# Patient Record
Sex: FEMALE | Race: WHITE | NOT HISPANIC OR LATINO | Employment: FULL TIME | URBAN - METROPOLITAN AREA
[De-identification: names, ages, dates, MRNs, and addresses within clinical notes are randomized per-mention and may not be internally consistent; named-entity substitution may affect disease eponyms.]

---

## 2017-02-21 ENCOUNTER — GENERIC CONVERSION - ENCOUNTER (OUTPATIENT)
Dept: OTHER | Facility: OTHER | Age: 22
End: 2017-02-21

## 2017-04-18 ENCOUNTER — ALLSCRIPTS OFFICE VISIT (OUTPATIENT)
Dept: OTHER | Facility: OTHER | Age: 22
End: 2017-04-18

## 2018-01-11 ENCOUNTER — ALLSCRIPTS OFFICE VISIT (OUTPATIENT)
Dept: OTHER | Facility: OTHER | Age: 23
End: 2018-01-11

## 2018-01-11 ENCOUNTER — GENERIC CONVERSION - ENCOUNTER (OUTPATIENT)
Dept: OTHER | Facility: OTHER | Age: 23
End: 2018-01-11

## 2018-01-11 DIAGNOSIS — G44.209 TENSION-TYPE HEADACHE, NOT INTRACTABLE: ICD-10-CM

## 2018-01-11 DIAGNOSIS — J30.2 OTHER SEASONAL ALLERGIC RHINITIS: ICD-10-CM

## 2018-01-11 DIAGNOSIS — R42 DIZZINESS AND GIDDINESS: ICD-10-CM

## 2018-01-11 DIAGNOSIS — R09.82 POSTNASAL DRIP: ICD-10-CM

## 2018-01-11 DIAGNOSIS — R53.83 OTHER FATIGUE: ICD-10-CM

## 2018-01-13 VITALS
DIASTOLIC BLOOD PRESSURE: 82 MMHG | BODY MASS INDEX: 21.22 KG/M2 | SYSTOLIC BLOOD PRESSURE: 128 MMHG | WEIGHT: 127.38 LBS | TEMPERATURE: 98 F | OXYGEN SATURATION: 99 % | HEART RATE: 78 BPM | HEIGHT: 65 IN

## 2018-01-13 NOTE — PROGRESS NOTES
Assessment   1  Dizziness (780 4) (R42)   2  Headache, tension-type (339 10) (G44 209)   3  Post-nasal drip (784 91) (R09 82)   4  Fatigue (780 79) (R53 83)   5  Sleep disturbances (780 50) (G47 9)    Plan   Dizziness, Headache, tension-type, Post-nasal drip, Seasonal allergies    · (1) COMPREHENSIVE METABOLIC PANEL; Status:Active; Requested [id number]; Dizziness, Post-nasal drip, Seasonal allergies    · (1) CBC/PLT/DIFF; Status:Active; Requested [id number]; Fatigue    · (1) TSH WITH FT4 REFLEX; Status:Active; Requested [id number]; Post-nasal drip    · CT SINUS WO CONTRAST; Status:Need Information - Financial Authorization; Requested [id number];   Seasonal allergies    · (1) ALLERGY, NORTHEAST PANEL ADULT; Status:Active; Requested [id number]; Discussion/Summary      Symptoms of dizziness and headache- check CT of sinuses and allergy panel  Check labs  We will call with these results  Ibuprofen 600 mg or Excedrin Migraine for headache pain  disturbance- take melatonin 5- 10 mg one hour before bed  The patient was counseled regarding  Possible side effects of new medications were reviewed with the patient/guardian today  The treatment plan was reviewed with the patient/guardian  The patient/guardian understands and agrees with the treatment plan       Self Referrals: No      Chief Complaint   Patient seen in office today for a c/o not feeling right - stated that she feels like she is in a fog, almost going to pass out and headaches as well - symptoms started about 3 weeks ago and the throbbing started 3 days ago  History of Present Illness   Pt has been feeling off for the past three weeks  She has a feeling of foggy and not alert  She feels dizziness and at times feels like she is going to pass out  No blackouts  For the past few days, she has felt throbbing in the right frontal area of her head  She has a weird tingling sensation at the base of her neck which radiates forward  She has been able to exercise daily  She has had no change in her diet  No nausea  Denies double vision or blurred vision  Two weeks ago, had URI symptoms which have resolved  Symptoms are intermittent  She is able to function at work without incident  has anxiety which she states prevents her from sleeping well  She has trouble calming her brain down to sleep  the past she did try Melatonin but a friend told her that it will make me hypothermic in my sleep and so she stopped taking it  Review of Systems        Constitutional: feeling poorly, but-- as noted in HPI  Eyes: No complaints of eye pain, no red eyes, no eyesight problems, no discharge, no dry eyes, no itching of eyes  ENT: ear fullness, but-- as noted in HPI  Cardiovascular: No complaints of slow heart rate, no fast heart rate, no chest pain, no palpitations, no leg claudication, no lower extremity edema  Neurological: headache, but-- as noted in HPI  Psychiatric: anxiety-- and-- sleep disturbances, but-- as noted in HPI  Active Problems   1  Seasonal allergies (477 9) (J30 2)   2  Sebaceous cyst (706 2) (L72 3)    Past Medical History   1  History of Allergic rhinitis (477 9) (J30 9)   2  History of Cough (786 2) (R05)   3  History of Dysuria (788 1) (R30 0)   4  History of Sorethroat (462) (J02 9)   5  History of Urinary tract infection (599 0) (N39 0)     The active problems and past medical history were reviewed and updated today  Surgical History      The surgical history was reviewed and updated today  Family History   Mother    1  No pertinent family history  Father    2  No pertinent family history     The family history was reviewed and updated today  Social History    · Caffeine Use   · Never a smoker   · Never Drank Alcohol  The social history was reviewed and updated today  The social history was reviewed and is unchanged  Current Meds    1   Tri-Sprintec 0 18/0 215/0 25 MG-35 MCG Oral Tablet; TAKE AS DIRECTED; Therapy: (Recorded:18Apr2017) to Recorded     The medication list was reviewed and updated today  Allergies   1  Augmentin TABS    Physical Exam        Constitutional      General appearance: No acute distress, well appearing and well nourished  Eyes      Conjunctiva and lids: No swelling, erythema or discharge  Pupils and irises: Equal, round and reactive to light  Ears, Nose, Mouth, and Throat      External inspection of ears and nose: Normal        Otoscopic examination: Tympanic membranes translucent with normal light reflex  Canals patent without erythema  Nasal mucosa, septum, and turbinates: Normal without edema or erythema  Oropharynx: Normal with no erythema, edema, exudate or lesions  Pulmonary      Respiratory effort: No increased work of breathing or signs of respiratory distress  Auscultation of lungs: Clear to auscultation  Cardiovascular      Palpation of heart: Normal PMI, no thrills  Auscultation of heart: Normal rate and rhythm, normal S1 and S2, without murmurs  Carotid pulses: Normal        Abdomen      Abdomen: Non-tender, no masses  Lymphatic      Palpation of lymph nodes in neck: No lymphadenopathy  Musculoskeletal      Gait and station: Normal        Skin      Skin and subcutaneous tissue: Normal without rashes or lesions  Neurologic      Cranial nerves: Cranial nerves 2-12 intact  Sensation: No sensory loss         Psychiatric      Orientation to person, place, and time: Normal        Mood and affect: Normal           Signatures    Electronically signed by : Naty Hoyt NP; Jan 11 2018  4:28PM EST                       (Author)     Electronically signed by : Molly Banda MD; Jan 12 2018  8:16AM EST                       (Co-author)

## 2018-01-17 ENCOUNTER — GENERIC CONVERSION - ENCOUNTER (OUTPATIENT)
Dept: OTHER | Facility: OTHER | Age: 23
End: 2018-01-17

## 2018-01-18 LAB
A/G RATIO (HISTORICAL): 2.3 (ref 1.2–2.2)
ALBUMIN SERPL BCP-MCNC: 4.6 G/DL (ref 3.5–5.5)
ALP SERPL-CCNC: 51 IU/L (ref 39–117)
ALT SERPL W P-5'-P-CCNC: 12 IU/L (ref 0–32)
AST SERPL W P-5'-P-CCNC: 14 IU/L (ref 0–40)
BASOPHILS # BLD AUTO: 0.1 X10E3/UL (ref 0–0.2)
BASOPHILS # BLD AUTO: 1 %
BILIRUB SERPL-MCNC: 0.2 MG/DL (ref 0–1.2)
BUN SERPL-MCNC: 14 MG/DL (ref 6–20)
BUN/CREA RATIO (HISTORICAL): 16 (ref 9–23)
CALCIUM SERPL-MCNC: 9.4 MG/DL (ref 8.7–10.2)
CHLORIDE SERPL-SCNC: 103 MMOL/L (ref 96–106)
CO2 SERPL-SCNC: 24 MMOL/L (ref 18–29)
CREAT SERPL-MCNC: 0.89 MG/DL (ref 0.57–1)
DEPRECATED RDW RBC AUTO: 13.3 % (ref 12.3–15.4)
EGFR AFRICAN AMERICAN (HISTORICAL): 105 ML/MIN/1.73
EGFR-AMERICAN CALC (HISTORICAL): 91 ML/MIN/1.73
EOSINOPHIL # BLD AUTO: 0.2 X10E3/UL (ref 0–0.4)
EOSINOPHIL # BLD AUTO: 2 %
GLUCOSE SERPL-MCNC: 82 MG/DL (ref 65–99)
HCT VFR BLD AUTO: 42.1 % (ref 34–46.6)
HGB BLD-MCNC: 13.8 G/DL (ref 11.1–15.9)
IMM.GRANULOCYTES (CD4/8) (HISTORICAL): 0 %
IMM.GRANULOCYTES (CD4/8) (HISTORICAL): 0 X10E3/UL (ref 0–0.1)
LYMPHOCYTES # BLD AUTO: 2.4 X10E3/UL (ref 0.7–3.1)
LYMPHOCYTES # BLD AUTO: 26 %
MCH RBC QN AUTO: 30 PG (ref 26.6–33)
MCHC RBC AUTO-ENTMCNC: 32.8 G/DL (ref 31.5–35.7)
MCV RBC AUTO: 92 FL (ref 79–97)
MONOCYTES # BLD AUTO: 0.5 X10E3/UL (ref 0.1–0.9)
MONOCYTES (HISTORICAL): 5 %
NEUTROPHILS # BLD AUTO: 6.1 X10E3/UL (ref 1.4–7)
NEUTROPHILS # BLD AUTO: 66 %
PLATELET # BLD AUTO: 285 X10E3/UL (ref 150–379)
POTASSIUM SERPL-SCNC: 4.5 MMOL/L (ref 3.5–5.2)
RBC (HISTORICAL): 4.6 X10E6/UL (ref 3.77–5.28)
SODIUM SERPL-SCNC: 142 MMOL/L (ref 134–144)
TOT. GLOBULIN, SERUM (HISTORICAL): 2 G/DL (ref 1.5–4.5)
TOTAL PROTEIN (HISTORICAL): 6.6 G/DL (ref 6–8.5)
TSH SERPL DL<=0.05 MIU/L-ACNC: 1.87 UIU/ML (ref 0.45–4.5)
WBC # BLD AUTO: 9.2 X10E3/UL (ref 3.4–10.8)

## 2018-01-23 LAB
ALLERGEN CAT EPITHELIUM-DANDER (HISTORICAL): 0.9 KU/L
ALLERGEN ELM (HISTORICAL): <0.1 KU/L
ALLERGEN MAPLE/BOX ELDER (HISTORICAL): <0.1 KU/L
ALLERGEN OAK, WHITE (HISTORICAL): 0.13 KU/L
ALLERGEN ROUGH PIGWEED (W14) IGE (HISTORICAL): <0.1 KU/L
ALLERGEN SHEEP SORREL (W18) IGE (HISTORICAL): <0.1 KU/L
ALLERGEN, COMMON SILVER BIRCH (HISTORICAL): 0.17 KU/L
ALTERNARIA ALTERNATA (HISTORICAL): <0.1 KU/L
ASPERGILLUS FUMIGATUS (HISTORICAL): <0.1 KU/L
BAHIA GRASS (HISTORICAL): 0.65 KU/L
BERMUDA GRASS (HISTORICAL): <0.1 KU/L
CLADOSPORIUM HERBARUM (HISTORICAL): <0.1 KU/L
COCKROACH (HISTORICAL): <0.1 KU/L
COMMON RAGWEED (HISTORICAL): <0.1 KU/L
D. FARINAE (HISTORICAL): 3.38 KU/L
D. PTERONYSSINUS (HISTORICAL): 6.58 KU/L
DOG DANDER (HISTORICAL): 1.4 KU/L
IMMUNOGLOBULIN E, TOTAL (HISTORICAL): 45 IU/ML (ref 0–100)
Lab: <0.1 KU/L
MOUNTAIN CEDAR TREE (HISTORICAL): <0.1 KU/L
MOUSE URINE (HISTORICAL): <0.1 KU/L
NETTLE (HISTORICAL): <0.1 KU/L
PENICILLIUM CHRYSOGENUM (HISTORICAL): <0.1 KU/L
TIMOTHY GRASS (HISTORICAL): 1.92 KU/L

## 2018-01-24 VITALS
TEMPERATURE: 98.6 F | HEIGHT: 65 IN | DIASTOLIC BLOOD PRESSURE: 80 MMHG | OXYGEN SATURATION: 99 % | WEIGHT: 127 LBS | HEART RATE: 86 BPM | BODY MASS INDEX: 21.16 KG/M2 | SYSTOLIC BLOOD PRESSURE: 128 MMHG

## 2018-06-20 ENCOUNTER — APPOINTMENT (OUTPATIENT)
Dept: LAB | Facility: HOSPITAL | Age: 23
End: 2018-06-20
Payer: COMMERCIAL

## 2018-06-20 ENCOUNTER — OFFICE VISIT (OUTPATIENT)
Dept: FAMILY MEDICINE CLINIC | Facility: CLINIC | Age: 23
End: 2018-06-20
Payer: COMMERCIAL

## 2018-06-20 VITALS
WEIGHT: 126.8 LBS | TEMPERATURE: 97.5 F | HEART RATE: 75 BPM | HEIGHT: 65 IN | OXYGEN SATURATION: 99 % | SYSTOLIC BLOOD PRESSURE: 110 MMHG | DIASTOLIC BLOOD PRESSURE: 78 MMHG | BODY MASS INDEX: 21.13 KG/M2

## 2018-06-20 DIAGNOSIS — J32.9 CHRONIC SINUSITIS, UNSPECIFIED LOCATION: Primary | ICD-10-CM

## 2018-06-20 DIAGNOSIS — L01.00 IMPETIGO: ICD-10-CM

## 2018-06-20 DIAGNOSIS — R42 DIZZINESS: ICD-10-CM

## 2018-06-20 DIAGNOSIS — Z91.09 ENVIRONMENTAL ALLERGIES: ICD-10-CM

## 2018-06-20 PROCEDURE — 87070 CULTURE OTHR SPECIMN AEROBIC: CPT

## 2018-06-20 PROCEDURE — 87186 SC STD MICRODIL/AGAR DIL: CPT

## 2018-06-20 PROCEDURE — 99214 OFFICE O/P EST MOD 30 MIN: CPT | Performed by: FAMILY MEDICINE

## 2018-06-20 PROCEDURE — 87147 CULTURE TYPE IMMUNOLOGIC: CPT

## 2018-06-20 PROCEDURE — 1036F TOBACCO NON-USER: CPT | Performed by: FAMILY MEDICINE

## 2018-06-20 PROCEDURE — 3008F BODY MASS INDEX DOCD: CPT | Performed by: FAMILY MEDICINE

## 2018-06-20 PROCEDURE — 87205 SMEAR GRAM STAIN: CPT

## 2018-06-20 RX ORDER — NORGESTIMATE AND ETHINYL ESTRADIOL 7DAYSX3 28
KIT ORAL
COMMUNITY
Start: 2018-05-30 | End: 2020-10-15 | Stop reason: ALTCHOICE

## 2018-06-20 RX ORDER — DOXYCYCLINE HYCLATE 100 MG/1
100 CAPSULE ORAL EVERY 12 HOURS SCHEDULED
Qty: 14 CAPSULE | Refills: 0 | Status: SHIPPED | OUTPATIENT
Start: 2018-06-20 | End: 2018-06-27

## 2018-06-20 NOTE — PROGRESS NOTES
Assessment/Plan:    No problem-specific Assessment & Plan notes found for this encounter  Diagnoses and all orders for this visit:    Chronic sinusitis, unspecified location  after discussing risks and benefits with patient CT scan and justyn RUSSO ordered  -     CT sinus wo contrast; Future  -     loratadine-pseudoephedrine (CLARITIN-D 12-HOUR) 5-120 mg per tablet; Take 1 tablet by mouth 2 (two) times a day for 5 days  -     Ambulatory referral to Allergy; Future    Environmental allergies  -     Ambulatory referral to Allergy; Future    Impetigo  after discussing risk and benefits of medication along with side effects with patient will start:  -     mupirocin (BACTROBAN) 2 % nasal ointment; into each nostril 2 (two) times a day  -     doxycycline hyclate (VIBRAMYCIN) 100 mg capsule; Take 1 capsule (100 mg total) by mouth every 12 (twelve) hours for 7 days    Dizziness  Labyrinthitis? Claritin D should help  Other orders  -     Peters Camera 0 18/0 215/0 25 MG-35 MCG per tablet; Follow up in 1 month or as needed    Subjective:      Patient ID: Caroline Mathews is a 23 y o  female  Patient is here to follow up for several issues she has been feeling dizzy for the past several months which has been on and off for her  She denies any chest pain or shortness of breath related to this  She had blood work done, cbc, TSH and glucose in January 2018 all normal   She also has been having sinus pressure associated with drainage as well  She was supposed to go for a CT scan of the sinuses but never went  Also has a spot on her nose that comes and goes and is crusted  The following portions of the patient's history were reviewed and updated as appropriate:   She  has no past medical history on file    She   Patient Active Problem List    Diagnosis Date Noted    Chronic sinusitis 06/20/2018    Environmental allergies 06/20/2018    Impetigo 06/20/2018    Dizziness 06/20/2018     She  has no past surgical history on file  Her family history is not on file  She  reports that she has never smoked  She has never used smokeless tobacco  Her alcohol and drug histories are not on file  Current Outpatient Prescriptions   Medication Sig Dispense Refill    TRI FEMYNOR 0 18/0 215/0 25 MG-35 MCG per tablet       doxycycline hyclate (VIBRAMYCIN) 100 mg capsule Take 1 capsule (100 mg total) by mouth every 12 (twelve) hours for 7 days 14 capsule 0    loratadine-pseudoephedrine (CLARITIN-D 12-HOUR) 5-120 mg per tablet Take 1 tablet by mouth 2 (two) times a day for 5 days 10 tablet 0    mupirocin (BACTROBAN) 2 % nasal ointment into each nostril 2 (two) times a day 10 g 0     No current facility-administered medications for this visit  No current outpatient prescriptions on file prior to visit  No current facility-administered medications on file prior to visit  She is allergic to amoxicillin-pot clavulanate       Review of Systems   Constitutional: Negative for activity change, appetite change, fatigue and fever  HENT: Positive for postnasal drip and sinus pain  Negative for congestion and ear discharge  Respiratory: Negative for cough and shortness of breath  Cardiovascular: Negative for chest pain and palpitations  Gastrointestinal: Negative for diarrhea and nausea  Musculoskeletal: Negative for arthralgias and back pain  Skin: Positive for color change and rash  Neurological: Positive for dizziness  Negative for headaches  Psychiatric/Behavioral: Negative for agitation and behavioral problems  Objective:      /78   Pulse 75   Temp 97 5 °F (36 4 °C)   Ht 5' 5" (1 651 m)   Wt 57 5 kg (126 lb 12 8 oz)   LMP 06/03/2018   SpO2 99%   BMI 21 10 kg/m²          Physical Exam   Constitutional: She is oriented to person, place, and time  She appears well-developed and well-nourished  No distress  HENT:   Head: Normocephalic and atraumatic     Nose: Nose normal    Mouth/Throat: Oropharynx is clear and moist    Bilateral TM effusion no erythema noted however  Honey crusted lesion noted around her noted  Eyes: Conjunctivae are normal  Pupils are equal, round, and reactive to light  Cardiovascular: Normal rate, regular rhythm and normal heart sounds  No murmur heard  Pulmonary/Chest: Effort normal and breath sounds normal  No respiratory distress  She has no wheezes  Abdominal: Soft  Bowel sounds are normal  She exhibits no distension  There is no tenderness  Neurological: She is alert and oriented to person, place, and time  Skin: Skin is warm and dry  No rash noted  She is not diaphoretic  No erythema  Psychiatric: She has a normal mood and affect

## 2018-06-21 ENCOUNTER — TELEPHONE (OUTPATIENT)
Dept: FAMILY MEDICINE CLINIC | Facility: CLINIC | Age: 23
End: 2018-06-21

## 2018-06-21 LAB
EXTERNAL HIV SCREEN: NORMAL
HCV AB SER-ACNC: NEGATIVE

## 2018-06-24 LAB
BACTERIA WND AEROBE CULT: ABNORMAL
BACTERIA WND AEROBE CULT: ABNORMAL
GRAM STN SPEC: ABNORMAL

## 2018-12-19 ENCOUNTER — OFFICE VISIT (OUTPATIENT)
Dept: FAMILY MEDICINE CLINIC | Facility: CLINIC | Age: 23
End: 2018-12-19
Payer: COMMERCIAL

## 2018-12-19 VITALS
BODY MASS INDEX: 20.99 KG/M2 | RESPIRATION RATE: 18 BRPM | WEIGHT: 126 LBS | TEMPERATURE: 98.4 F | HEIGHT: 65 IN | HEART RATE: 71 BPM | DIASTOLIC BLOOD PRESSURE: 64 MMHG | OXYGEN SATURATION: 99 % | SYSTOLIC BLOOD PRESSURE: 112 MMHG

## 2018-12-19 DIAGNOSIS — B34.9 VIRAL ILLNESS: Primary | ICD-10-CM

## 2018-12-19 PROCEDURE — 99213 OFFICE O/P EST LOW 20 MIN: CPT | Performed by: NURSE PRACTITIONER

## 2018-12-19 PROCEDURE — 3008F BODY MASS INDEX DOCD: CPT | Performed by: NURSE PRACTITIONER

## 2018-12-19 PROCEDURE — 1036F TOBACCO NON-USER: CPT | Performed by: NURSE PRACTITIONER

## 2018-12-19 RX ORDER — BROMPHENIRAMINE MALEATE, PSEUDOEPHEDRINE HYDROCHLORIDE, AND DEXTROMETHORPHAN HYDROBROMIDE 2; 30; 10 MG/5ML; MG/5ML; MG/5ML
5 SYRUP ORAL 4 TIMES DAILY PRN
Qty: 120 ML | Refills: 0 | Status: SHIPPED | OUTPATIENT
Start: 2018-12-19 | End: 2018-12-26

## 2018-12-19 RX ORDER — BROMPHENIRAMINE MALEATE, PSEUDOEPHEDRINE HYDROCHLORIDE, AND DEXTROMETHORPHAN HYDROBROMIDE 2; 30; 10 MG/5ML; MG/5ML; MG/5ML
5 SYRUP ORAL 4 TIMES DAILY PRN
Qty: 120 ML | Refills: 0 | Status: SHIPPED | OUTPATIENT
Start: 2018-12-19 | End: 2018-12-19 | Stop reason: SDUPTHER

## 2018-12-19 NOTE — LETTER
December 19, 2018     Patient: Naomi Tineo   YOB: 1995   Date of Visit: 12/19/2018       To Whom it May Concern:    Naomi Tineo is under my professional care  She was seen in my office on 12/19/2018  She may return to work on 12-20-18  If you have any questions or concerns, please don't hesitate to call           Sincerely,          EDUARDA Beck        CC: No Recipients

## 2018-12-19 NOTE — PATIENT INSTRUCTIONS
URI with cough and congestion- treat symptoms   Take prescription decongestant as ordered  May work from home until she feels better  She may need work note extended  Motrin or tylenol for achiness and fever

## 2018-12-19 NOTE — PROGRESS NOTES
Assessment/Plan:    No problem-specific Assessment & Plan notes found for this encounter  Diagnoses and all orders for this visit:    Viral illness  -     Discontinue: brompheniramine-pseudoephedrine-DM 30-2-10 MG/5ML syrup; Take 5 mL by mouth 4 (four) times a day as needed for congestion or cough for up to 7 days  -     brompheniramine-pseudoephedrine-DM 30-2-10 MG/5ML syrup; Take 5 mL by mouth 4 (four) times a day as needed for congestion or cough for up to 7 days          Subjective:      Patient ID: Chris Jackson is a 20 y o  female  Patient is here with two days of cough and cold symptoms  She has sore throat, ear pain and nasal congestion  She took Nyquil last night   No fever  The following portions of the patient's history were reviewed and updated as appropriate:   She  has no past medical history on file  She   Patient Active Problem List    Diagnosis Date Noted    Viral illness 12/19/2018    Chronic sinusitis 06/20/2018    Environmental allergies 06/20/2018    Impetigo 06/20/2018    Dizziness 06/20/2018     She  has no past surgical history on file  Her family history is not on file  She  reports that she has never smoked  She has never used smokeless tobacco  Her alcohol and drug histories are not on file  Current Outpatient Prescriptions   Medication Sig Dispense Refill    brompheniramine-pseudoephedrine-DM 30-2-10 MG/5ML syrup Take 5 mL by mouth 4 (four) times a day as needed for congestion or cough for up to 7 days 120 mL 0    mupirocin (BACTROBAN) 2 % nasal ointment into each nostril 2 (two) times a day (Patient not taking: Reported on 12/19/2018 ) 10 g 0    TRI FEMYNOR 0 18/0 215/0 25 MG-35 MCG per tablet        No current facility-administered medications for this visit        Current Outpatient Prescriptions on File Prior to Visit   Medication Sig    mupirocin (BACTROBAN) 2 % nasal ointment into each nostril 2 (two) times a day (Patient not taking: Reported on 12/19/2018 )    TRI FEMYNOR 0 18/0 215/0 25 MG-35 MCG per tablet      No current facility-administered medications on file prior to visit  She is allergic to amoxicillin-pot clavulanate       Review of Systems   Constitutional: Negative for fatigue and fever  HENT: Positive for congestion, ear pain and sore throat  Negative for postnasal drip, sinus pain and sinus pressure  Respiratory: Positive for cough  Negative for chest tightness and shortness of breath  Cardiovascular: Negative for chest pain and palpitations  Gastrointestinal: Negative for abdominal pain  Skin: Negative for color change, pallor and rash  Allergic/Immunologic: Negative for immunocompromised state  Neurological: Negative for headaches  Hematological: Negative for adenopathy  All other systems reviewed and are negative  Objective:      /64 (BP Location: Left arm, Patient Position: Sitting)   Pulse 71   Temp 98 4 °F (36 9 °C)   Resp 18   Ht 5' 5" (1 651 m)   Wt 57 2 kg (126 lb)   SpO2 99%   BMI 20 97 kg/m²          Physical Exam   Constitutional: She is oriented to person, place, and time  She appears well-developed and well-nourished  No distress  HENT:   Head: Normocephalic and atraumatic  Right Ear: External ear normal    Left Ear: External ear normal    Nose: Nose normal    Mouth/Throat: Oropharynx is clear and moist  No oropharyngeal exudate  Eyes: Pupils are equal, round, and reactive to light  Conjunctivae are normal  No scleral icterus  Neck: Normal range of motion  Neck supple  Cardiovascular: Normal rate, regular rhythm and normal heart sounds  Exam reveals no gallop and no friction rub  No murmur heard  Pulmonary/Chest: Effort normal and breath sounds normal  No respiratory distress  She has no wheezes  She has no rales  Abdominal: Soft  Musculoskeletal: Normal range of motion  She exhibits no edema  Lymphadenopathy:     She has no cervical adenopathy     Neurological: She is alert and oriented to person, place, and time  Skin: Skin is warm and dry  No rash noted  She is not diaphoretic  Psychiatric: She has a normal mood and affect  Her behavior is normal  Judgment and thought content normal    Nursing note and vitals reviewed

## 2019-03-31 ENCOUNTER — OFFICE VISIT (OUTPATIENT)
Dept: URGENT CARE | Facility: CLINIC | Age: 24
End: 2019-03-31
Payer: COMMERCIAL

## 2019-03-31 VITALS
HEIGHT: 65 IN | WEIGHT: 126 LBS | SYSTOLIC BLOOD PRESSURE: 114 MMHG | DIASTOLIC BLOOD PRESSURE: 68 MMHG | RESPIRATION RATE: 16 BRPM | HEART RATE: 79 BPM | TEMPERATURE: 98.3 F | BODY MASS INDEX: 20.99 KG/M2 | OXYGEN SATURATION: 98 %

## 2019-03-31 DIAGNOSIS — J34.89 NASAL SORE: Primary | ICD-10-CM

## 2019-03-31 PROCEDURE — 87070 CULTURE OTHR SPECIMN AEROBIC: CPT | Performed by: PHYSICIAN ASSISTANT

## 2019-03-31 PROCEDURE — 87205 SMEAR GRAM STAIN: CPT | Performed by: PHYSICIAN ASSISTANT

## 2019-03-31 PROCEDURE — 99213 OFFICE O/P EST LOW 20 MIN: CPT | Performed by: PHYSICIAN ASSISTANT

## 2019-03-31 PROCEDURE — 87252 VIRUS INOCULATION TISSUE: CPT | Performed by: PHYSICIAN ASSISTANT

## 2019-03-31 RX ORDER — DIPHENOXYLATE HYDROCHLORIDE AND ATROPINE SULFATE 2.5; .025 MG/1; MG/1
1 TABLET ORAL
COMMUNITY
End: 2020-10-15 | Stop reason: ALTCHOICE

## 2019-03-31 RX ORDER — FLUTICASONE PROPIONATE 50 MCG
1 SPRAY, SUSPENSION (ML) NASAL DAILY
COMMUNITY
End: 2020-01-27

## 2019-04-02 LAB
BACTERIA WND AEROBE CULT: NORMAL
GRAM STN SPEC: NORMAL

## 2020-01-26 PROBLEM — R09.82 POST-NASAL DRIP: Status: ACTIVE | Noted: 2018-01-11

## 2020-01-26 PROBLEM — M54.6 ACUTE RIGHT-SIDED THORACIC BACK PAIN: Status: ACTIVE | Noted: 2017-04-18

## 2020-01-26 PROBLEM — L72.0 EPIDERMOID CYST OF SKIN: Status: ACTIVE | Noted: 2017-04-18

## 2020-01-26 PROBLEM — G44.209 HEADACHE, TENSION-TYPE: Status: ACTIVE | Noted: 2018-01-11

## 2020-01-26 PROBLEM — Z30.41 SURVEILLANCE FOR BIRTH CONTROL, ORAL CONTRACEPTIVES: Status: ACTIVE | Noted: 2018-05-21

## 2020-01-26 PROBLEM — G47.9 SLEEP DISTURBANCES: Status: ACTIVE | Noted: 2018-01-11

## 2020-01-26 PROBLEM — R53.83 FATIGUE: Status: ACTIVE | Noted: 2018-01-11

## 2020-01-26 RX ORDER — FLUCONAZOLE 150 MG/1
TABLET ORAL
COMMUNITY
Start: 2019-08-08 | End: 2020-01-27

## 2020-01-27 ENCOUNTER — APPOINTMENT (OUTPATIENT)
Dept: RADIOLOGY | Facility: CLINIC | Age: 25
End: 2020-01-27
Payer: COMMERCIAL

## 2020-01-27 ENCOUNTER — OFFICE VISIT (OUTPATIENT)
Dept: FAMILY MEDICINE CLINIC | Facility: CLINIC | Age: 25
End: 2020-01-27
Payer: COMMERCIAL

## 2020-01-27 VITALS
TEMPERATURE: 99.2 F | HEART RATE: 86 BPM | HEIGHT: 65 IN | WEIGHT: 132.8 LBS | OXYGEN SATURATION: 98 % | BODY MASS INDEX: 22.13 KG/M2 | SYSTOLIC BLOOD PRESSURE: 118 MMHG | DIASTOLIC BLOOD PRESSURE: 76 MMHG

## 2020-01-27 DIAGNOSIS — R05.3 CHRONIC COUGH: ICD-10-CM

## 2020-01-27 DIAGNOSIS — R05.3 CHRONIC COUGH: Primary | ICD-10-CM

## 2020-01-27 PROBLEM — L72.0 EPIDERMOID CYST OF SKIN: Status: RESOLVED | Noted: 2017-04-18 | Resolved: 2020-01-27

## 2020-01-27 PROBLEM — Z30.41 SURVEILLANCE FOR BIRTH CONTROL, ORAL CONTRACEPTIVES: Status: RESOLVED | Noted: 2018-05-21 | Resolved: 2020-01-27

## 2020-01-27 PROBLEM — R42 DIZZINESS: Status: RESOLVED | Noted: 2018-06-20 | Resolved: 2020-01-27

## 2020-01-27 PROBLEM — B34.9 VIRAL ILLNESS: Status: RESOLVED | Noted: 2018-12-19 | Resolved: 2020-01-27

## 2020-01-27 PROBLEM — L01.00 IMPETIGO: Status: RESOLVED | Noted: 2018-06-20 | Resolved: 2020-01-27

## 2020-01-27 PROBLEM — G44.209 HEADACHE, TENSION-TYPE: Status: RESOLVED | Noted: 2018-01-11 | Resolved: 2020-01-27

## 2020-01-27 PROBLEM — G47.9 SLEEP DISTURBANCES: Status: RESOLVED | Noted: 2018-01-11 | Resolved: 2020-01-27

## 2020-01-27 PROBLEM — M54.6 ACUTE RIGHT-SIDED THORACIC BACK PAIN: Status: RESOLVED | Noted: 2017-04-18 | Resolved: 2020-01-27

## 2020-01-27 PROCEDURE — 99214 OFFICE O/P EST MOD 30 MIN: CPT | Performed by: FAMILY MEDICINE

## 2020-01-27 PROCEDURE — 3008F BODY MASS INDEX DOCD: CPT | Performed by: FAMILY MEDICINE

## 2020-01-27 PROCEDURE — 71046 X-RAY EXAM CHEST 2 VIEWS: CPT

## 2020-01-27 RX ORDER — FLUTICASONE PROPIONATE 50 MCG
1 SPRAY, SUSPENSION (ML) NASAL DAILY
Qty: 1 BOTTLE | Refills: 0 | Status: SHIPPED | OUTPATIENT
Start: 2020-01-27 | End: 2020-02-10

## 2020-01-27 NOTE — PROGRESS NOTES
Jennifer Yu 1995 female MRN: [id number]      ASSESSMENT/PLAN  Problem List Items Addressed This Visit     None      Visit Diagnoses     Chronic cough    -  Primary    Relevant Medications    fluticasone (FLONASE) 50 mcg/act nasal spray    Other Relevant Orders    XR chest pa & lateral    Screening for HIV (human immunodeficiency virus)            Chronic cough:   Check CXR  Discussed various causes of chronic cough including GERD, cough-variant asthma, post-nasal drip  Given pt's history of allergies, which she states are "really bad" right now, it is more likely post-nasal drip due to allergic rhinitis  Start OTC allergy medication + Flonase daily and RTC in 2 weeks to monitor symptom improvement  If no improvement, consider albuterol/PFTs  HIV screening UTD -- will link records  Pt would like to have TDap done at work  Future Appointments   Date Time Provider [place] [place]   2/10/2020  4:00 PM Michael Friend, DO [facility]          SUBJECTIVE  CC: Cough (since Sept)      HPI:  Jennifer Yu is a 30 y o  female who presents for evaluation of cough  Cough started in 9/2019  No associated URI symptoms   Chronic rhinorrhea -- has a great deal of allergies -- takes OTC allergy medication when it gets bad (seasonal, bad at night)  Started a new job in 4/2019; otherwise no new exposures   Dry cough with throat irritation and sometimes productive   Coughs to the point of gagging/near vomiting   Tried mucinex, bronchaid which helps for a few hours  No history of asthma, no sensation of reflux       Review of Systems   HENT: Positive for congestion and rhinorrhea  Respiratory: Positive for cough  Historical Information   The patient history was reviewed and updated as follows:    No past medical history on file    Past Surgical History:   Procedure Laterality Date    ADENOIDECTOMY      KNEE ARTHROSCOPY      TONSILLECTOMY      WISDOM TOOTH EXTRACTION       Family History   Problem Relation Age of Onset    No Known Problems Mother     No Known Problems Father       Social History   Social History     Substance and Sexual Activity   Alcohol Use Never    Frequency: Never     Social History     Substance and Sexual Activity   Drug Use Not on file     Social History     Tobacco Use   Smoking Status Never Smoker   Smokeless Tobacco Never Used       Medications:     Current Outpatient Medications:     fluticasone (FLONASE) 50 mcg/act nasal spray, 1 spray into each nostril daily, Disp: 1 Bottle, Rfl: 0    multivitamin (THERAGRAN) TABS, Take 1 tablet by mouth, Disp: , Rfl:     TRI FEMYNOR 0 18/0 215/0 25 MG-35 MCG per tablet, , Disp: , Rfl:   Allergies   Allergen Reactions    Amoxicillin-Pot Clavulanate GI Intolerance     Other reaction(s): Unknown Reaction  Other reaction(s): STOMACH UPSET  Reaction when pt was toddler  Other reaction(s): STOMACH UPSET  Reaction when pt was toddler  Other reaction(s): Unknown Reaction  Other reaction(s): STOMACH UPSET  Reaction when pt was toddler       OBJECTIVE    Vitals:   Vitals:    01/27/20 1551   BP: 118/76   Pulse: 86   Temp: 99 2 °F (37 3 °C)   SpO2: 98%   Weight: 60 2 kg (132 lb 12 8 oz)   Height: 5' 5" (1 651 m)           Physical Exam   Constitutional: She appears well-developed and well-nourished  No distress  HENT:   Head: Normocephalic and atraumatic  Right Ear: External ear normal  Tympanic membrane is not erythematous, not retracted and not bulging  Left Ear: External ear normal  Tympanic membrane is not erythematous, not retracted and not bulging  Nose: Mucosal edema and rhinorrhea present  Mouth/Throat: Oropharynx is clear and moist  No oropharyngeal exudate, posterior oropharyngeal edema or posterior oropharyngeal erythema  Eyes: Conjunctivae are normal    Neck: No thyromegaly present  Cardiovascular: Normal rate and regular rhythm     Pulmonary/Chest: Effort normal and breath sounds normal  No respiratory distress  She has no decreased breath sounds  She has no wheezes  She has no rales  Lymphadenopathy:     She has no cervical adenopathy  Neurological: She is alert  Skin: Skin is warm and dry  Psychiatric: She has a normal mood and affect  Vitals reviewed                   DO [facility]   1/27/2020  4:12 PM

## 2020-01-31 ENCOUNTER — TELEPHONE (OUTPATIENT)
Dept: FAMILY MEDICINE CLINIC | Facility: CLINIC | Age: 25
End: 2020-01-31

## 2020-01-31 NOTE — TELEPHONE ENCOUNTER
Esperanza Hassan was in on the 27th and would like something else prescribed besides the Flonase  She does not like the side effects  [facility]

## 2020-01-31 NOTE — TELEPHONE ENCOUNTER
I would suggest trying an OTC nasal saline rinse instead (such as Mitesh Winslow 83 or similar)  Thanks!

## 2020-02-10 ENCOUNTER — OFFICE VISIT (OUTPATIENT)
Dept: FAMILY MEDICINE CLINIC | Facility: CLINIC | Age: 25
End: 2020-02-10
Payer: COMMERCIAL

## 2020-02-10 VITALS
HEIGHT: 65 IN | DIASTOLIC BLOOD PRESSURE: 78 MMHG | SYSTOLIC BLOOD PRESSURE: 118 MMHG | BODY MASS INDEX: 22.16 KG/M2 | TEMPERATURE: 100.5 F | OXYGEN SATURATION: 98 % | HEART RATE: 78 BPM | WEIGHT: 133 LBS

## 2020-02-10 DIAGNOSIS — R09.82 POST-NASAL DRIP: ICD-10-CM

## 2020-02-10 DIAGNOSIS — R05.3 CHRONIC COUGH: Primary | ICD-10-CM

## 2020-02-10 DIAGNOSIS — F41.9 ANXIETY: ICD-10-CM

## 2020-02-10 PROCEDURE — 99213 OFFICE O/P EST LOW 20 MIN: CPT | Performed by: FAMILY MEDICINE

## 2020-02-10 PROCEDURE — 3008F BODY MASS INDEX DOCD: CPT | Performed by: FAMILY MEDICINE

## 2020-02-10 PROCEDURE — 1036F TOBACCO NON-USER: CPT | Performed by: FAMILY MEDICINE

## 2020-02-10 NOTE — PROGRESS NOTES
Cris Nieto 1995 female MRN: [id number]      ASSESSMENT/PLAN  Problem List Items Addressed This Visit        Other    Post-nasal drip    Relevant Orders    Ambulatory Referral to Otolaryngology    Chronic cough - Primary    Relevant Orders    Ambulatory Referral to Otolaryngology    Anxiety        Given persistence of cough, will refer to ENT to further evaluation post-nasal drip vs possible silent reflux  Anxiety: Discussed possible treatments including SSRI vs Hydroxyzine PRN  Reviewed possible ADRs of SSRI including GI upset, somnolence, initial worsening of symptoms  Discussed max therapeutic potential can take 4-8 weeks  Pt would like to research prior to starting anything  Patient information from [employer] provided, and pt will reach out via [employer] when she decides whether she would like to start medication or not  Also encouraged to return to talk therapy, which pt has previously done  No future appointments  SUBJECTIVE  CC: Follow-up      HPI:  Cris Nieto is a 30 y o  female who presents for follow up of chronic cough  1/27:   "Cough started in 9/2019  No associated URI symptoms   Chronic rhinorrhea -- has a great deal of allergies -- takes OTC allergy medication when it gets bad (seasonal, bad at night)  Started a new job in 4/2019; otherwise no new exposures   Dry cough with throat irritation and sometimes productive   Coughs to the point of gagging/near vomiting   Tried mucinex, bronchaid which helps for a few hours  No history of asthma, no sensation of reflux "  Thought to be secondary to post nasal drip -- encouraged to start Flonase + OTC allergy medication  Today:   Taking Allegra daily for a week, then was less consistent with it  Had to switch from Flonase to nasal saline due to intolerance  States her cough has improved somewhat, but is still present, especially at night     Does now feel nasal drip in the back of her throat     Anxiety  - Has had it for a few years, and has previously been able to manage with talk therapy, but it has been overwhelming lately   - Is planning to go back to therapy, but is wondering what other options there are for treatment   - Nervous about SSRIs because her Step-Dad, , and friend's Mom have all had back reactions to them     Review of Systems   Respiratory: Positive for cough  Psychiatric/Behavioral: The patient is nervous/anxious          Historical Information   The patient history was reviewed and updated as follows:    Past Medical History:   Diagnosis Date    Anxiety 2/10/2020    Epidermoid cyst of skin 4/18/2017    Headache, tension-type 1/11/2018    Impetigo 6/20/2018    Sleep disturbances 1/11/2018     Past Surgical History:   Procedure Laterality Date    ADENOIDECTOMY      KNEE ARTHROSCOPY      TONSILLECTOMY      WISDOM TOOTH EXTRACTION       Family History   Problem Relation Age of Onset    No Known Problems Mother     No Known Problems Father       Social History   Social History     Substance and Sexual Activity   Alcohol Use Never    Frequency: Never     Social History     Substance and Sexual Activity   Drug Use Not on file     Social History     Tobacco Use   Smoking Status Never Smoker   Smokeless Tobacco Never Used       Medications:     Current Outpatient Medications:     multivitamin (THERAGRAN) TABS, Take 1 tablet by mouth, Disp: , Rfl:     TRI FEMYNOR 0 18/0 215/0 25 MG-35 MCG per tablet, , Disp: , Rfl:   Allergies   Allergen Reactions    Amoxicillin-Pot Clavulanate GI Intolerance     Other reaction(s): Unknown Reaction  Other reaction(s): STOMACH UPSET  Reaction when pt was toddler  Other reaction(s): STOMACH UPSET  Reaction when pt was toddler  Other reaction(s): Unknown Reaction  Other reaction(s): STOMACH UPSET  Reaction when pt was toddler       OBJECTIVE    Vitals:   Vitals:    02/10/20 1603   BP: 118/78   Pulse: 78   Temp: 100 5 °F (38 1 °C)   SpO2: 98%   Weight: 60 3 kg (133 lb) Height: 5' 5" (1 651 m)           Physical Exam   Constitutional: She appears well-developed and well-nourished  No distress  HENT:   Head: Normocephalic and atraumatic  Pulmonary/Chest: Effort normal    Neurological: She is alert  Psychiatric: She has a normal mood and affect  Vitals reviewed                   DO [facility]   2/10/2020  5:02 PM

## 2020-10-05 ENCOUNTER — TELEPHONE (OUTPATIENT)
Dept: FAMILY MEDICINE CLINIC | Facility: CLINIC | Age: 25
End: 2020-10-05

## 2020-10-15 ENCOUNTER — TELEMEDICINE (OUTPATIENT)
Dept: FAMILY MEDICINE CLINIC | Facility: CLINIC | Age: 25
End: 2020-10-15
Payer: COMMERCIAL

## 2020-10-15 VITALS — TEMPERATURE: 97.9 F

## 2020-10-15 DIAGNOSIS — Z20.822 CLOSE EXPOSURE TO COVID-19 VIRUS: Primary | ICD-10-CM

## 2020-10-15 PROBLEM — O03.9 MISCARRIAGE: Status: RESOLVED | Noted: 2020-09-03 | Resolved: 2020-10-15

## 2020-10-15 PROBLEM — R09.82 POST-NASAL DRIP: Status: RESOLVED | Noted: 2018-01-11 | Resolved: 2020-10-15

## 2020-10-15 PROBLEM — O03.9 MISCARRIAGE: Status: ACTIVE | Noted: 2020-09-03

## 2020-10-15 PROBLEM — R53.83 FATIGUE: Status: RESOLVED | Noted: 2018-01-11 | Resolved: 2020-10-15

## 2020-10-15 PROCEDURE — 99212 OFFICE O/P EST SF 10 MIN: CPT | Performed by: FAMILY MEDICINE

## 2020-10-15 PROCEDURE — 1036F TOBACCO NON-USER: CPT | Performed by: FAMILY MEDICINE

## 2021-10-08 ENCOUNTER — OFFICE VISIT (OUTPATIENT)
Dept: FAMILY MEDICINE CLINIC | Facility: CLINIC | Age: 26
End: 2021-10-08
Payer: COMMERCIAL

## 2021-10-08 VITALS
SYSTOLIC BLOOD PRESSURE: 138 MMHG | TEMPERATURE: 99.1 F | OXYGEN SATURATION: 99 % | WEIGHT: 146 LBS | HEIGHT: 65 IN | BODY MASS INDEX: 24.32 KG/M2 | DIASTOLIC BLOOD PRESSURE: 86 MMHG | HEART RATE: 106 BPM

## 2021-10-08 DIAGNOSIS — Z23 NEED FOR HEPATITIS B VACCINATION: ICD-10-CM

## 2021-10-08 DIAGNOSIS — Z23 NEED FOR MMR VACCINE: ICD-10-CM

## 2021-10-08 DIAGNOSIS — Z00.00 HEALTHCARE MAINTENANCE: Primary | ICD-10-CM

## 2021-10-08 PROBLEM — O14.10 PREECLAMPSIA, SEVERE: Status: ACTIVE | Noted: 2021-08-29

## 2021-10-08 PROBLEM — Z20.822 CLOSE EXPOSURE TO COVID-19 VIRUS: Status: RESOLVED | Noted: 2020-10-15 | Resolved: 2021-10-08

## 2021-10-08 PROBLEM — O14.10 PREECLAMPSIA, SEVERE: Status: RESOLVED | Noted: 2021-08-29 | Resolved: 2021-10-08

## 2021-10-08 PROCEDURE — 90471 IMMUNIZATION ADMIN: CPT | Performed by: FAMILY MEDICINE

## 2021-10-08 PROCEDURE — 3008F BODY MASS INDEX DOCD: CPT | Performed by: FAMILY MEDICINE

## 2021-10-08 PROCEDURE — 90472 IMMUNIZATION ADMIN EACH ADD: CPT | Performed by: FAMILY MEDICINE

## 2021-10-08 PROCEDURE — 1036F TOBACCO NON-USER: CPT | Performed by: FAMILY MEDICINE

## 2021-10-08 PROCEDURE — 99395 PREV VISIT EST AGE 18-39: CPT | Performed by: FAMILY MEDICINE

## 2021-10-08 PROCEDURE — 90746 HEPB VACCINE 3 DOSE ADULT IM: CPT | Performed by: FAMILY MEDICINE

## 2021-10-08 PROCEDURE — 3725F SCREEN DEPRESSION PERFORMED: CPT | Performed by: FAMILY MEDICINE

## 2021-10-08 PROCEDURE — 90707 MMR VACCINE SC: CPT | Performed by: FAMILY MEDICINE

## 2021-10-08 RX ORDER — ACETAMINOPHEN AND CODEINE PHOSPHATE 120; 12 MG/5ML; MG/5ML
0.35 SOLUTION ORAL DAILY
COMMUNITY
Start: 2021-09-29 | End: 2021-12-03 | Stop reason: ALTCHOICE

## 2021-10-08 RX ORDER — NIFEDIPINE 30 MG/1
30 TABLET, EXTENDED RELEASE ORAL DAILY
COMMUNITY
Start: 2021-09-05 | End: 2021-12-03 | Stop reason: ALTCHOICE

## 2021-10-08 RX ORDER — FAMOTIDINE 20 MG/1
20 TABLET, FILM COATED ORAL 2 TIMES DAILY PRN
COMMUNITY
Start: 2021-03-01 | End: 2021-12-03

## 2021-10-08 RX ORDER — OXYCODONE HYDROCHLORIDE 5 MG/1
5 TABLET ORAL EVERY 4 HOURS PRN
COMMUNITY
Start: 2021-09-04 | End: 2021-12-03

## 2021-10-08 RX ORDER — PROGESTERONE 90 MG/1.125G
1 GEL VAGINAL DAILY
COMMUNITY
Start: 2020-12-23 | End: 2022-01-26

## 2021-12-02 RX ORDER — NORETHINDRONE ACETATE AND ETHINYL ESTRADIOL 1MG-20(21)
1 KIT ORAL DAILY
COMMUNITY
Start: 2021-11-23 | End: 2022-11-23

## 2021-12-03 ENCOUNTER — OFFICE VISIT (OUTPATIENT)
Dept: FAMILY MEDICINE CLINIC | Facility: CLINIC | Age: 26
End: 2021-12-03
Payer: COMMERCIAL

## 2021-12-03 VITALS
WEIGHT: 150 LBS | DIASTOLIC BLOOD PRESSURE: 84 MMHG | HEART RATE: 89 BPM | SYSTOLIC BLOOD PRESSURE: 128 MMHG | HEIGHT: 65 IN | OXYGEN SATURATION: 99 % | BODY MASS INDEX: 24.99 KG/M2

## 2021-12-03 DIAGNOSIS — B00.1 HERPES LABIALIS: Primary | ICD-10-CM

## 2021-12-03 PROCEDURE — 1036F TOBACCO NON-USER: CPT | Performed by: FAMILY MEDICINE

## 2021-12-03 PROCEDURE — 3008F BODY MASS INDEX DOCD: CPT | Performed by: FAMILY MEDICINE

## 2021-12-03 PROCEDURE — 99213 OFFICE O/P EST LOW 20 MIN: CPT | Performed by: FAMILY MEDICINE

## 2021-12-03 RX ORDER — VALACYCLOVIR HYDROCHLORIDE 500 MG/1
500 TABLET, FILM COATED ORAL DAILY
Qty: 90 TABLET | Refills: 0 | Status: SHIPPED | OUTPATIENT
Start: 2021-12-03 | End: 2022-01-26

## 2022-01-26 ENCOUNTER — OFFICE VISIT (OUTPATIENT)
Dept: FAMILY MEDICINE CLINIC | Facility: CLINIC | Age: 27
End: 2022-01-26
Payer: COMMERCIAL

## 2022-01-26 VITALS
HEART RATE: 77 BPM | HEIGHT: 65 IN | DIASTOLIC BLOOD PRESSURE: 76 MMHG | SYSTOLIC BLOOD PRESSURE: 128 MMHG | WEIGHT: 151 LBS | BODY MASS INDEX: 25.16 KG/M2 | TEMPERATURE: 99.5 F | OXYGEN SATURATION: 99 %

## 2022-01-26 DIAGNOSIS — R42 VERTIGO: Primary | ICD-10-CM

## 2022-01-26 PROCEDURE — 1036F TOBACCO NON-USER: CPT | Performed by: FAMILY MEDICINE

## 2022-01-26 PROCEDURE — 99213 OFFICE O/P EST LOW 20 MIN: CPT | Performed by: FAMILY MEDICINE

## 2022-01-26 PROCEDURE — 3008F BODY MASS INDEX DOCD: CPT | Performed by: FAMILY MEDICINE

## 2022-01-26 NOTE — PROGRESS NOTES
Con Dozier 1995 female MRN: [id number]      ASSESSMENT/PLAN  Problem List Items Addressed This Visit     None      Visit Diagnoses     Vertigo    -  Primary    Relevant Orders    Ambulatory Referral to Physical Therapy        History suggestive of vertigo  Benign neuro exam  Pt did not tolerate meclizine in the past  Will refer to VT for symptom management  F/u if symptoms worsen/do not improve  Future Appointments   Date Time Provider Sosa Blanton   3/4/2022  3:40 PM DO CHARI Mendez  [facility]          SUBJECTIVE  CC: Dizziness      HPI:  Con Dozier is a 27 y o  female who presents due to dizziness  About 3 months ago, noted that she would get "off center" easily (like when she was walking and reading at her phones)  Then developed a fullness in her L ear     She started doing HIIT workouts and she developed a sensation of water in her ear (muffled),   Last week, turned in bed and had an episode of vertigo -- was able to recreate it if she turns her head to the L  Thought it had improved, but last night had another episode; resolved within 20 seconds, but felt nauseated and had diaphoresis     Had a similar situation about 5 years ago  Was previously given a medication, but it made her feel worse      Review of Systems   Constitutional: Positive for diaphoresis (with most recent episode)  HENT: Positive for hearing loss (one episode) and tinnitus (maybe a little bit with episode of muffled hearing)  Ear pain: fullness  Gastrointestinal: Positive for nausea (with most recent episode   )  Neurological: Positive for dizziness         Historical Information   The patient history was reviewed and updated as follows:    Past Medical History:   Diagnosis Date    Anxiety 2/10/2020    Epidermoid cyst of skin 4/18/2017    Headache, tension-type 1/11/2018    Impetigo 6/20/2018    Miscarriage 9/3/2020    Last Assessment & Plan:  Treated w Cytotec then d+e,  Advised about pelvic rest for 2 wks Continue pnv , folic acid if she is planning to get pregnant again  Call if any abnormal bleeding or pain   Preeclampsia, severe 8/29/2021    Sleep disturbances 1/11/2018     Past Surgical History:   Procedure Laterality Date    ADENOIDECTOMY      KNEE ARTHROSCOPY      TONSILLECTOMY      WISDOM TOOTH EXTRACTION       Family History   Problem Relation Age of Onset    No Known Problems Mother     No Known Problems Father       Social History   Social History     Substance and Sexual Activity   Alcohol Use Never     Social History     Substance and Sexual Activity   Drug Use Not on file     Social History     Tobacco Use   Smoking Status Never Smoker   Smokeless Tobacco Never Used       Medications:     Current Outpatient Medications:     norethindrone-ethinyl estradiol ([facility] 1/20) 1-20 MG-MCG per tablet, Take 1 tablet by mouth daily, Disp: , Rfl:   Allergies   Allergen Reactions    Amoxicillin-Pot Clavulanate GI Intolerance     Other reaction(s): Unknown Reaction  Other reaction(s): STOMACH UPSET  Reaction when pt was toddler  Other reaction(s): STOMACH UPSET  Reaction when pt was toddler  Other reaction(s): Unknown Reaction  Other reaction(s): STOMACH UPSET  Reaction when pt was toddler       OBJECTIVE    Vitals:   Vitals:    01/26/22 1327   BP: 128/76   Pulse: 77   Temp: 99 5 °F (37 5 °C)   SpO2: 99%   Weight: 68 5 kg (151 lb)   Height: 5' 5" (1 651 m)           Physical Exam  Vitals and nursing note reviewed  Constitutional:       General: She is not in acute distress  Appearance: Normal appearance  HENT:      Head: Normocephalic and atraumatic  Right Ear: Tympanic membrane, ear canal and external ear normal       Left Ear: Tympanic membrane, ear canal and external ear normal    Pulmonary:      Effort: Pulmonary effort is normal  No respiratory distress  Neurological:      Mental Status: She is alert  Cranial Nerves: Cranial nerves are intact        Sensory: Sensation is intact  Motor: Motor function is intact  No weakness, tremor or pronator drift  Coordination: Coordination is intact  Romberg sign negative  Coordination normal  Finger-Nose-Finger Test and Heel to [facility] Test normal  Rapid alternating movements normal       Gait: Gait is intact  Deep Tendon Reflexes:      Reflex Scores:       Tricep reflexes are 2+ on the right side and 2+ on the left side  Bicep reflexes are 2+ on the right side and 2+ on the left side  Patellar reflexes are 2+ on the right side and 2+ on the left side    Psychiatric:         Mood and Affect: Mood normal                     Larraine Roles, DO  [facility]  [address] [facility]   1/26/2022  1:40 PM

## 2022-03-04 ENCOUNTER — OFFICE VISIT (OUTPATIENT)
Dept: FAMILY MEDICINE CLINIC | Facility: CLINIC | Age: 27
End: 2022-03-04
Payer: COMMERCIAL

## 2022-03-04 VITALS
SYSTOLIC BLOOD PRESSURE: 116 MMHG | DIASTOLIC BLOOD PRESSURE: 78 MMHG | TEMPERATURE: 99.3 F | OXYGEN SATURATION: 98 % | BODY MASS INDEX: 24.83 KG/M2 | HEART RATE: 78 BPM | WEIGHT: 149 LBS | HEIGHT: 65 IN

## 2022-03-04 DIAGNOSIS — B00.1 RECURRENT COLD SORES: Primary | ICD-10-CM

## 2022-03-04 PROBLEM — R05.3 CHRONIC COUGH: Status: RESOLVED | Noted: 2020-01-27 | Resolved: 2022-03-04

## 2022-03-04 PROCEDURE — 3008F BODY MASS INDEX DOCD: CPT | Performed by: FAMILY MEDICINE

## 2022-03-04 PROCEDURE — 99213 OFFICE O/P EST LOW 20 MIN: CPT | Performed by: FAMILY MEDICINE

## 2022-03-04 PROCEDURE — 1036F TOBACCO NON-USER: CPT | Performed by: FAMILY MEDICINE

## 2022-03-04 RX ORDER — PREDNISONE 1 MG/1
10 TABLET ORAL DAILY
COMMUNITY
Start: 2022-03-01 | End: 2022-03-06

## 2022-03-04 NOTE — PROGRESS NOTES
Raquel Cummings 1995 female MRN: [id number]      ASSESSMENT/PLAN  Problem List Items Addressed This Visit        Digestive    Recurrent cold sores - Primary        Discussed options for management including trial off suppressive management vs continuing current regimen for another 3 months  Pt would like to continue regimen and will f/u when she would like to trial weaning off  No future appointments  SUBJECTIVE  CC: Mouth Lesions (Patient seen in office today for a follow up since start daily Valtrex)      HPI:  Raquel Cummings is a 27 y o  female who presents for medication follow up  Has been on Valtrex daily suppressive therapy for about 3 months -- has only had one flare up, which was this week -- did take high dose treatment and during the day also used Abreva, Lysine, and infrared light therapy  Review of Systems   Skin: Positive for wound  Historical Information   The patient history was reviewed and updated as follows:    Past Medical History:   Diagnosis Date    Anxiety 2/10/2020    Epidermoid cyst of skin 4/18/2017    Headache, tension-type 1/11/2018    Impetigo 6/20/2018    Miscarriage 9/3/2020    Last Assessment & Plan:  Treated w Cytotec then d+e,  Advised about pelvic rest for 2 wks Continue pnv , folic acid if she is planning to get pregnant again  Call if any abnormal bleeding or pain      Preeclampsia, severe 8/29/2021    Sleep disturbances 1/11/2018     Past Surgical History:   Procedure Laterality Date    ADENOIDECTOMY      KNEE ARTHROSCOPY      TONSILLECTOMY      WISDOM TOOTH EXTRACTION       Family History   Problem Relation Age of Onset    No Known Problems Mother     No Known Problems Father       Social History   Social History     Substance and Sexual Activity   Alcohol Use Never     Social History     Substance and Sexual Activity   Drug Use Not on file     Social History     Tobacco Use   Smoking Status Never Smoker   Smokeless Tobacco Never Used Medications:     Current Outpatient Medications:     predniSONE 5 mg tablet, Take 10 mg by mouth daily, Disp: , Rfl:     norethindrone-ethinyl estradiol (JUNEL FE 1/20) 1-20 MG-MCG per tablet, Take 1 tablet by mouth daily, Disp: , Rfl:     valACYclovir (VALTREX) 500 mg tablet, Take 1 tablet (500 mg total) by mouth 2 (two) times a day, Disp: 180 tablet, Rfl: 1  Allergies   Allergen Reactions    Amoxicillin-Pot Clavulanate GI Intolerance     Other reaction(s): Unknown Reaction  Other reaction(s): STOMACH UPSET  Reaction when pt was toddler  Other reaction(s): STOMACH UPSET  Reaction when pt was toddler  Other reaction(s): Unknown Reaction  Other reaction(s): STOMACH UPSET  Reaction when pt was toddler       OBJECTIVE    Vitals:   Vitals:    03/04/22 1604   BP: 116/78   BP Location: Left arm   Patient Position: Sitting   Cuff Size: Standard   Pulse: 78   Temp: 99 3 °F (37 4 °C)   SpO2: 98%   Weight: 67 6 kg (149 lb)   Height: 5' 5" (1 651 m)           Physical Exam  Vitals and nursing note reviewed  Constitutional:       General: She is not in acute distress  Appearance: Normal appearance  HENT:      Head: Normocephalic and atraumatic  Nose:        Comments: Erythematous, circular, sub-centimeter lesion without crusting   Pulmonary:      Effort: Pulmonary effort is normal  No respiratory distress  Neurological:      General: No focal deficit present  Mental Status: She is alert     Psychiatric:         Mood and Affect: Mood normal                     DO [facility]  [address] [facility]   3/4/2022  4:09 PM

## 2022-05-01 ENCOUNTER — HOSPITAL ENCOUNTER (EMERGENCY)
Facility: HOSPITAL | Age: 27
Discharge: HOME | End: 2022-05-01
Attending: EMERGENCY MEDICINE | Admitting: EMERGENCY MEDICINE
Payer: COMMERCIAL

## 2022-05-01 ENCOUNTER — APPOINTMENT (EMERGENCY)
Dept: CT IMAGING | Facility: HOSPITAL | Age: 27
End: 2022-05-01
Payer: COMMERCIAL

## 2022-05-01 VITALS
HEART RATE: 87 BPM | SYSTOLIC BLOOD PRESSURE: 126 MMHG | WEIGHT: 144.18 LBS | DIASTOLIC BLOOD PRESSURE: 80 MMHG | TEMPERATURE: 98.4 F | OXYGEN SATURATION: 100 % | RESPIRATION RATE: 18 BRPM | BODY MASS INDEX: 24.02 KG/M2 | HEIGHT: 65 IN

## 2022-05-01 DIAGNOSIS — R22.1 NECK SWELLING: Primary | ICD-10-CM

## 2022-05-01 LAB
ALBUMIN SERPL BCP-MCNC: 3.5 G/DL (ref 3.5–5)
ALP SERPL-CCNC: 68 U/L (ref 46–116)
ALT SERPL W P-5'-P-CCNC: 14 U/L (ref 12–78)
ANION GAP SERPL CALCULATED.3IONS-SCNC: 8 MMOL/L (ref 4–13)
APTT PPP: 29 SECONDS (ref 23–37)
AST SERPL W P-5'-P-CCNC: 8 U/L (ref 5–45)
BASOPHILS # BLD AUTO: 0.07 THOUSANDS/ΜL (ref 0–0.1)
BASOPHILS NFR BLD AUTO: 1 % (ref 0–1)
BILIRUB DIRECT SERPL-MCNC: 0.16 MG/DL (ref 0–0.2)
BILIRUB SERPL-MCNC: 0.54 MG/DL (ref 0.2–1)
BUN SERPL-MCNC: 9 MG/DL (ref 5–25)
CALCIUM SERPL-MCNC: 8.3 MG/DL (ref 8.3–10.1)
CHLORIDE SERPL-SCNC: 105 MMOL/L (ref 100–108)
CO2 SERPL-SCNC: 26 MMOL/L (ref 21–32)
CREAT SERPL-MCNC: 0.76 MG/DL (ref 0.6–1.3)
EOSINOPHIL # BLD AUTO: 0.4 THOUSAND/ΜL (ref 0–0.61)
EOSINOPHIL NFR BLD AUTO: 5 % (ref 0–6)
ERYTHROCYTE [DISTWIDTH] IN BLOOD BY AUTOMATED COUNT: 12.6 % (ref 11.6–15.1)
GFR SERPL CREATININE-BSD FRML MDRD: 107 ML/MIN/1.73SQ M
GLUCOSE SERPL-MCNC: 100 MG/DL (ref 65–140)
HCT VFR BLD AUTO: 43.8 % (ref 34.8–46.1)
HGB BLD-MCNC: 14.9 G/DL (ref 11.5–15.4)
IMM GRANULOCYTES # BLD AUTO: 0.01 THOUSAND/UL (ref 0–0.2)
IMM GRANULOCYTES NFR BLD AUTO: 0 % (ref 0–2)
INR PPP: 1.06 (ref 0.84–1.19)
LYMPHOCYTES # BLD AUTO: 2.2 THOUSANDS/ΜL (ref 0.6–4.47)
LYMPHOCYTES NFR BLD AUTO: 28 % (ref 14–44)
MCH RBC QN AUTO: 31.9 PG (ref 26.8–34.3)
MCHC RBC AUTO-ENTMCNC: 34 G/DL (ref 31.4–37.4)
MCV RBC AUTO: 94 FL (ref 82–98)
MONOCYTES # BLD AUTO: 0.52 THOUSAND/ΜL (ref 0.17–1.22)
MONOCYTES NFR BLD AUTO: 7 % (ref 4–12)
NEUTROPHILS # BLD AUTO: 4.54 THOUSANDS/ΜL (ref 1.85–7.62)
NEUTS SEG NFR BLD AUTO: 59 % (ref 43–75)
NRBC BLD AUTO-RTO: 0 /100 WBCS
PLATELET # BLD AUTO: 335 THOUSANDS/UL (ref 149–390)
PMV BLD AUTO: 8.9 FL (ref 8.9–12.7)
POTASSIUM SERPL-SCNC: 3.3 MMOL/L (ref 3.5–5.3)
PROT SERPL-MCNC: 6.4 G/DL (ref 6.4–8.2)
PROTHROMBIN TIME: 13.4 SECONDS (ref 11.6–14.5)
RBC # BLD AUTO: 4.67 MILLION/UL (ref 3.81–5.12)
SODIUM SERPL-SCNC: 139 MMOL/L (ref 136–145)
TSH SERPL DL<=0.05 MIU/L-ACNC: 3.07 UIU/ML (ref 0.45–4.5)
WBC # BLD AUTO: 7.74 THOUSAND/UL (ref 4.31–10.16)

## 2022-05-01 PROCEDURE — 84443 ASSAY THYROID STIM HORMONE: CPT | Performed by: EMERGENCY MEDICINE

## 2022-05-01 PROCEDURE — 99284 EMERGENCY DEPT VISIT MOD MDM: CPT | Performed by: EMERGENCY MEDICINE

## 2022-05-01 PROCEDURE — 80076 HEPATIC FUNCTION PANEL: CPT | Performed by: EMERGENCY MEDICINE

## 2022-05-01 PROCEDURE — 70491 CT SOFT TISSUE NECK W/DYE: CPT

## 2022-05-01 PROCEDURE — 36415 COLL VENOUS BLD VENIPUNCTURE: CPT | Performed by: EMERGENCY MEDICINE

## 2022-05-01 PROCEDURE — 85025 COMPLETE CBC W/AUTO DIFF WBC: CPT | Performed by: EMERGENCY MEDICINE

## 2022-05-01 PROCEDURE — G1004 CDSM NDSC: HCPCS

## 2022-05-01 PROCEDURE — 85730 THROMBOPLASTIN TIME PARTIAL: CPT | Performed by: EMERGENCY MEDICINE

## 2022-05-01 PROCEDURE — 80048 BASIC METABOLIC PNL TOTAL CA: CPT | Performed by: EMERGENCY MEDICINE

## 2022-05-01 PROCEDURE — 85610 PROTHROMBIN TIME: CPT | Performed by: EMERGENCY MEDICINE

## 2022-05-01 PROCEDURE — 99284 EMERGENCY DEPT VISIT MOD MDM: CPT

## 2022-05-01 RX ORDER — CLINDAMYCIN HYDROCHLORIDE 150 MG/1
300 CAPSULE ORAL EVERY 8 HOURS SCHEDULED
Qty: 42 CAPSULE | Refills: 0 | Status: SHIPPED | OUTPATIENT
Start: 2022-05-01 | End: 2022-05-08

## 2022-05-01 RX ADMIN — IOHEXOL 100 ML: 350 INJECTION, SOLUTION INTRAVENOUS at 20:51

## 2022-08-11 DIAGNOSIS — M54.2 NECK PAIN: Primary | ICD-10-CM

## 2022-10-05 ENCOUNTER — HOSPITAL ENCOUNTER (EMERGENCY)
Facility: HOSPITAL | Age: 27
Discharge: HOME | End: 2022-10-05
Attending: EMERGENCY MEDICINE
Payer: COMMERCIAL

## 2022-10-05 VITALS
HEIGHT: 65 IN | RESPIRATION RATE: 18 BRPM | WEIGHT: 132 LBS | SYSTOLIC BLOOD PRESSURE: 120 MMHG | OXYGEN SATURATION: 98 % | BODY MASS INDEX: 21.99 KG/M2 | DIASTOLIC BLOOD PRESSURE: 80 MMHG | TEMPERATURE: 98.5 F | HEART RATE: 88 BPM

## 2022-10-05 DIAGNOSIS — O20.0 THREATENED MISCARRIAGE: Primary | ICD-10-CM

## 2022-10-05 LAB — B-HCG SERPL-ACNC: ABNORMAL MIU/ML

## 2022-10-05 PROCEDURE — 84702 CHORIONIC GONADOTROPIN TEST: CPT | Performed by: EMERGENCY MEDICINE

## 2022-10-05 PROCEDURE — 99284 EMERGENCY DEPT VISIT MOD MDM: CPT | Performed by: EMERGENCY MEDICINE

## 2022-10-05 PROCEDURE — 36415 COLL VENOUS BLD VENIPUNCTURE: CPT | Performed by: EMERGENCY MEDICINE

## 2022-10-05 PROCEDURE — 99284 EMERGENCY DEPT VISIT MOD MDM: CPT

## 2022-10-05 NOTE — DISCHARGE INSTRUCTIONS
Currently you have an intrauterine pregnancy with a fetal heartbeat  Normal care  Tylenol if needed for cramping  Follow-up with your provider for persistent pain or bleeding    Return worsening symptoms or any problems

## 2022-10-05 NOTE — ED PROVIDER NOTES
History  Chief Complaint   Patient presents with    Vaginal Bleeding     Pt c/o bright red blood/spotting onset today; pt reports she is 6 weeks 5days pregnant; ; sent here by OB for eval; transvaginal US completed yesterday     HPI patient is a 61-year-old female, currently 6 weeks pregnant  Patient had a transvaginal ultrasound yesterday which showed a intrauterine pregnancy at 6 weeks 5 days  Patient reports today she has had some intermittent spotting  Apparently had blood on the toilet paper today  She reports after the transvaginal ultrasound she had some cramping  Patient apparently called her obstetrician was referred to the emergency department  Patient denies any active cramping at this time  She reports no heavy bleeding or passing tissue  Denies any vomiting  Patient reports  2 miscarriages previously  Past medical history anxiety, miscarriage, blood type is Rh positive  Family history noncontributory  Social history nonsmoker no history of drug abuse    Prior to Admission Medications   Prescriptions Last Dose Informant Patient Reported? Taking?   norethindrone-ethinyl estradiol (JUNEL ) 1-20 MG-MCG per tablet   Yes No   Sig: Take 1 tablet by mouth daily   valACYclovir (VALTREX) 500 mg tablet   No No   Sig: Take 1 tablet (500 mg total) by mouth 2 (two) times a day      Facility-Administered Medications: None       Past Medical History:   Diagnosis Date    Anxiety 2/10/2020    Epidermoid cyst of skin 2017    Headache, tension-type 2018    Impetigo 2018    Miscarriage 9/3/2020    Last Assessment & Plan:  Treated w Cytotec then d+e,  Advised about pelvic rest for 2 wks Continue pnv , folic acid if she is planning to get pregnant again  Call if any abnormal bleeding or pain      Preeclampsia, severe 2021    Sleep disturbances 2018       Past Surgical History:   Procedure Laterality Date    ADENOIDECTOMY      KNEE ARTHROSCOPY      TONSILLECTOMY  WISDOM TOOTH EXTRACTION         Family History   Problem Relation Age of Onset    No Known Problems Mother     No Known Problems Father      I have reviewed and agree with the history as documented  E-Cigarette/Vaping    E-Cigarette Use Current Every Day User      E-Cigarette/Vaping Substances    Nicotine No     THC No     CBD No     Flavoring No     Other No     Unknown No      Social History     Tobacco Use    Smoking status: Never Smoker    Smokeless tobacco: Never Used   Vaping Use    Vaping Use: Every day   Substance Use Topics    Alcohol use: Never    Drug use: Never       Review of Systems   Constitutional: Negative for fever  HENT: Negative for congestion  Eyes: Negative for pain and redness  Respiratory: Negative for cough and shortness of breath  Cardiovascular: Negative for chest pain  Gastrointestinal: Negative for abdominal pain and vomiting  Genitourinary: Positive for vaginal bleeding  Physical Exam  Physical Exam  Vitals and nursing note reviewed  Constitutional:       Appearance: She is well-developed  HENT:      Head: Normocephalic  Right Ear: External ear normal       Left Ear: External ear normal       Nose: Nose normal    Eyes:      General: Lids are normal       Pupils: Pupils are equal, round, and reactive to light  Pulmonary:      Effort: Pulmonary effort is normal  No respiratory distress  Abdominal:      General: Abdomen is flat  Bowel sounds are normal       Tenderness: There is no abdominal tenderness  Musculoskeletal:         General: No deformity  Normal range of motion  Cervical back: Normal range of motion and neck supple  Skin:     General: Skin is warm and dry  Neurological:      Mental Status: She is alert and oriented to person, place, and time           Vital Signs  ED Triage Vitals [10/05/22 1544]   Temperature Pulse Respirations Blood Pressure SpO2   98 5 °F (36 9 °C) 88 18 136/77 98 %      Temp Source Heart Rate Source Patient Position - Orthostatic VS BP Location FiO2 (%)   Oral Monitor Sitting Left arm --      Pain Score       --           Vitals:    10/05/22 1544   BP: 136/77   Pulse: 88   Patient Position - Orthostatic VS: Sitting         Visual Acuity      ED Medications  Medications - No data to display    Diagnostic Studies  Results Reviewed     Procedure Component Value Units Date/Time    hCG, quantitative [[id number]] Collected: 10/05/22 1632    Lab Status: No result Specimen: Blood from Arm, Left                  No orders to display              Procedures  Procedures         ED Course            bedside ultrasound shows an intrauterine pregnancy at 6 weeks with a fetal heartbeat  MDM medical decision making 29-year-old female presents with spotting after transvaginal ultrasound yesterday, no active bleeding here  Bedside ultrasound shows an intrauterine pregnancy  Discussed outpatient treatment follow-up  Patient is Rh positive no indication for RhoGAM   Discussed indications to return  Discussed follow-up with her provider  Disposition  Final diagnoses:   Threatened miscarriage     Time reflects when diagnosis was documented in both MDM as applicable and the Disposition within this note     Time User Action Codes Description Comment    10/5/2022  3:58 PM Breana Machuca [O20 0] Threatened miscarriage       ED Disposition     ED Disposition   Discharge    Condition   Stable    Date/Time   Wed Oct 5, 2022  3:57 PM    Comment   Gabriel Living discharge to home/self care  Follow-up Information    None         Patient's Medications   Discharge Prescriptions    No medications on file       No discharge procedures on file      PDMP Review     None          ED Provider  Electronically Signed by           Ishan Velasquez MD  10/05/22 9725

## 2022-12-14 ENCOUNTER — TELEMEDICINE (OUTPATIENT)
Dept: FAMILY MEDICINE CLINIC | Facility: CLINIC | Age: 27
End: 2022-12-14

## 2022-12-14 ENCOUNTER — NURSE TRIAGE (OUTPATIENT)
Dept: OTHER | Facility: OTHER | Age: 27
End: 2022-12-14

## 2022-12-14 DIAGNOSIS — R09.81 SINUS CONGESTION: Primary | ICD-10-CM

## 2022-12-14 DIAGNOSIS — Z11.59 SCREENING FOR VIRAL DISEASE: ICD-10-CM

## 2022-12-14 LAB
SARS-COV-2 AG UPPER RESP QL IA: NEGATIVE
VALID CONTROL: NORMAL

## 2022-12-14 NOTE — TELEPHONE ENCOUNTER
Regarding: sore throat and head fog  ----- Message from [facility] sent at 12/14/2022  7:51 AM EST -----  "I started feeling bad yesterday    I have a sore throat and head fog "

## 2022-12-14 NOTE — TELEPHONE ENCOUNTER
Patient with sore throat, HA and congested that started yesterday  Patient is 17 weeks pregnant  Patient taking robitussin, tylenol, and saline spray for symptoms  Patient states she did not take a covid test  No virtual appts available, please follow up with patient for appointment today  Home care advice given

## 2022-12-14 NOTE — PROGRESS NOTES
Virtual Regular Visit    Verification of patient location:    Patient is located in the following state in which I hold an active license [place]      Assessment/Plan:    Problem List Items Addressed This Visit    None  Visit Diagnoses     Sinus congestion    -  Primary    Relevant Orders    Covid/Flu- Office Collect    Screening for viral disease        Relevant Orders    POCT Rapid Covid Ag (Completed)        Rapid COVID negative  Will send out for COVID/FLU swab  DW patient to continue supportive care  Safe to take tylenol, benadryl and flonase   If Flu positive will recommend tamiflu due to pregnancy status         Reason for visit is   Chief Complaint   Patient presents with   • Virtual Regular Visit          Encounter provider Hernandez Song PA-C    Provider located at [place] [address]  [address] [place] [id number]      Recent Visits  No visits were found meeting these conditions  Showing recent visits within past 7 days and meeting all other requirements  Today's Visits  Date Type Provider Dept   12/14/22 Telemedicine Nicolasa Shah PA-C [address] today's visits and meeting all other requirements  Future Appointments  No visits were found meeting these conditions  Showing future appointments within next 150 days and meeting all other requirements       The patient was identified by name and date of birth  Tahir Delgado was informed that this is a telemedicine visit and that the visit is being conducted through the [employer]  She agrees to proceed     My office door was closed  No one else was in the room  She acknowledged consent and understanding of privacy and security of the video platform  The patient has agreed to participate and understands they can discontinue the visit at any time  Patient is aware this is a billable service       Subjective  Tahir Delgado is a 32 y o  female c/o flu symptoms    Patient presents for flu symptoms  Patient is 17 weeks pregnant  She was at a football game Sunday  Yesterday began with sinus congestion in her face, aches and feeling unwell   She is taking robitussin, benadryl and tylenol   Denies SOB  Tolerating PO       Past Medical History:   Diagnosis Date   • Anxiety 2/10/2020   • Epidermoid cyst of skin 4/18/2017   • Headache, tension-type 1/11/2018   • Impetigo 6/20/2018   • Miscarriage 9/3/2020    Last Assessment & Plan:  Treated w Cytotec then d+e,  Advised about pelvic rest for 2 wks Continue pnv , folic acid if she is planning to get pregnant again  Call if any abnormal bleeding or pain  • Preeclampsia, severe 8/29/2021   • Sleep disturbances 1/11/2018       Past Surgical History:   Procedure Laterality Date   • ADENOIDECTOMY     • KNEE ARTHROSCOPY     • TONSILLECTOMY     • WISDOM TOOTH EXTRACTION         Current Outpatient Medications   Medication Sig Dispense Refill   • norethindrone-ethinyl estradiol (L[facility] FE 1/20) 1-20 MG-MCG per tablet Take 1 tablet by mouth daily     • valACYclovir (VALTREX) 500 mg tablet Take 1 tablet (500 mg total) by mouth 2 (two) times a day 180 tablet 1     No current facility-administered medications for this visit  Allergies   Allergen Reactions   • Amoxicillin-Pot Clavulanate GI Intolerance     Other reaction(s): Unknown Reaction  Other reaction(s): STOMACH UPSET  Reaction when pt was toddler  Other reaction(s): STOMACH UPSET  Reaction when pt was toddler  Other reaction(s): Unknown Reaction  Other reaction(s): STOMACH UPSET  Reaction when pt was toddler       Review of Systems   Constitutional: Positive for chills and fatigue  Negative for fever  HENT: Positive for congestion, sinus pressure and sinus pain  Negative for ear pain, sore throat and trouble swallowing  Eyes: Negative for pain, discharge and redness  Respiratory: Negative for cough, chest tightness, shortness of breath and wheezing  Cardiovascular: Negative for chest pain, palpitations and leg swelling  Gastrointestinal: Negative for abdominal pain, diarrhea, nausea and vomiting  Musculoskeletal: Negative for arthralgias, joint swelling and myalgias  Skin: Negative for rash  Neurological: Negative for dizziness, weakness, numbness and headaches  Video Exam    There were no vitals filed for this visit      Physical Exam   In NAD      I spent 12 minutes directly with the patient during this visit

## 2022-12-14 NOTE — TELEPHONE ENCOUNTER
Reason for Disposition  • [1] Using nasal washes and pain medicine > 24 hours AND [2] sinus pain (lower forehead, cheekbone, or eye) persists    Answer Assessment - Initial Assessment Questions  1  ONSET: "When did the symptoms start?"       Yesterday    3  COUGH: "Do you have a cough?" If yes, ask: "Describe the color of your sputum" (clear, white, yellow, green)     Dry cough     4  RESPIRATORY DISTRESS: "Describe your breathing "       Denies    5  FEVER: "Do you have a fever?" If Yes, ask: "What is your temperature, how was it measured, and when did it start?"      Denies    6  SEVERITY: "Overall, how bad are you feeling right now?" (e g , doesn't interfere with normal activities, staying home from school/work, staying in bed)       "Just sick, like I have a cold"    7  OTHER SYMPTOMS: "Do you have any other symptoms?" (e g , sore throat, earache, wheezing, vomiting)      Sore throat, HA, congestion    8   PREGNANCY: "Is there any chance you are pregnant?" "When was your last menstrual period?"      17 weeks pregnant    Robitussin, tylenol, saline spray    Protocols used: COMMON COLD-ADULT-AH

## 2022-12-15 DIAGNOSIS — J10.1 INFLUENZA A: Primary | ICD-10-CM

## 2022-12-15 LAB
FLUAV RNA RESP QL NAA+PROBE: POSITIVE
FLUBV RNA RESP QL NAA+PROBE: NEGATIVE
SARS-COV-2 RNA RESP QL NAA+PROBE: NEGATIVE

## 2022-12-15 RX ORDER — OSELTAMIVIR PHOSPHATE 75 MG/1
75 CAPSULE ORAL EVERY 12 HOURS SCHEDULED
Qty: 10 CAPSULE | Refills: 0 | Status: SHIPPED | OUTPATIENT
Start: 2022-12-15 | End: 2022-12-20

## 2022-12-23 DIAGNOSIS — B00.1 RECURRENT COLD SORES: ICD-10-CM

## 2022-12-23 RX ORDER — VALACYCLOVIR HYDROCHLORIDE 500 MG/1
500 TABLET, FILM COATED ORAL 2 TIMES DAILY
Qty: 180 TABLET | Refills: 0 | Status: SHIPPED | OUTPATIENT
Start: 2022-12-23 | End: 2023-03-23

## 2023-01-10 DIAGNOSIS — M54.50 LOW BACK PAIN, UNSPECIFIED BACK PAIN LATERALITY, UNSPECIFIED CHRONICITY, UNSPECIFIED WHETHER SCIATICA PRESENT: Primary | ICD-10-CM

## 2023-01-30 ENCOUNTER — TELEMEDICINE (OUTPATIENT)
Dept: FAMILY MEDICINE CLINIC | Facility: CLINIC | Age: 28
End: 2023-01-30

## 2023-01-30 VITALS — HEART RATE: 96 BPM | OXYGEN SATURATION: 97 %

## 2023-01-30 DIAGNOSIS — J02.9 SORE THROAT: Primary | ICD-10-CM

## 2023-01-30 DIAGNOSIS — B00.1 RECURRENT COLD SORES: ICD-10-CM

## 2023-01-30 LAB
S PYO AG THROAT QL: NEGATIVE
SARS-COV-2 AG UPPER RESP QL IA: NEGATIVE
VALID CONTROL: NORMAL

## 2023-01-30 NOTE — PROGRESS NOTES
Virtual Regular Visit    Verification of patient location:    Patient is located in the following state in which I hold an active license [place]      Assessment/Plan:    Problem List Items Addressed This Visit        Digestive    Recurrent cold sores    Relevant Medications    mupirocin (BACTROBAN) 2 % nasal ointment   Other Visit Diagnoses     Sore throat    -  Primary    Relevant Orders    POCT Rapid Covid Ag (Completed)    POCT rapid strepA (Completed)        Rapid strep negative  Rapid covid negative      recommend OTC remedies tylenol, benadryl, flonase and mucinex (blue box)    DW patient try bactroban on lesion on nose  She is taking valtrex for suppression therapy  It sounds viral however will cover bacterial and see if any improvement for at least a week    Reason for visit is   Chief Complaint   Patient presents with   • Virtual Regular Visit          Encounter provider Raúl Timmons PA-C    Provider located at [address]  [address] [place] [id number]      Recent Visits  No visits were found meeting these conditions  Showing recent visits within past 7 days and meeting all other requirements  Today's Visits  Date Type Provider Dept   01/30/23 Telemedicine Lorena Handing HERMINIO Shah PA-C [address] today's visits and meeting all other requirements  Future Appointments  No visits were found meeting these conditions  Showing future appointments within next 150 days and meeting all other requirements       The patient was identified by name and date of birth  Isauro Taylor was informed that this is a telemedicine visit and that the visit is being conducted through the [employer]  She agrees to proceed     My office door was closed  No one else was in the room  She acknowledged consent and understanding of privacy and security of the video platform   The patient has agreed to participate and understands they can discontinue the visit at any time  Patient is aware this is a billable service  Simons Cuff is a 32 y o  female with c/o URI symptoms      Patient 23 weeks pregnant started yesterday with sore throat, nasal congestion and headache  States as the day progressed the sore throat worsened  Having pain in her ears  Patient reports taking tylenol for pain  The congestion started this morning        Past Medical History:   Diagnosis Date   • Anxiety 2/10/2020   • Epidermoid cyst of skin 4/18/2017   • Headache, tension-type 1/11/2018   • Impetigo 6/20/2018   • Miscarriage 9/3/2020    Last Assessment & Plan:  Treated w Cytotec then d+e,  Advised about pelvic rest for 2 wks Continue pnv , folic acid if she is planning to get pregnant again  Call if any abnormal bleeding or pain  • Preeclampsia, severe 8/29/2021   • Sleep disturbances 1/11/2018       Past Surgical History:   Procedure Laterality Date   • ADENOIDECTOMY     • KNEE ARTHROSCOPY     • TONSILLECTOMY     • WISDOM TOOTH EXTRACTION         Current Outpatient Medications   Medication Sig Dispense Refill   • mupirocin (BACTROBAN) 2 % nasal ointment into each nostril 2 (two) times a day 10 g 0   • norethindrone-ethinyl estradiol (JUNEL FE 1/20) 1-20 MG-MCG per tablet Take 1 tablet by mouth daily     • valACYclovir (VALTREX) 500 mg tablet Take 1 tablet (500 mg total) by mouth 2 (two) times a day 180 tablet 0     No current facility-administered medications for this visit  Allergies   Allergen Reactions   • Amoxicillin-Pot Clavulanate GI Intolerance     Other reaction(s): Unknown Reaction  Other reaction(s): STOMACH UPSET  Reaction when pt was toddler  Other reaction(s): STOMACH UPSET  Reaction when pt was toddler  Other reaction(s): Unknown Reaction  Other reaction(s): STOMACH UPSET  Reaction when pt was toddler       Review of Systems   Constitutional: Negative for chills, fatigue and fever  HENT: Positive for congestion and sore throat  Negative for ear pain, sinus pain and trouble swallowing  Eyes: Negative for pain, discharge and redness  Respiratory: Negative for cough, chest tightness, shortness of breath and wheezing  Cardiovascular: Negative for chest pain, palpitations and leg swelling  Gastrointestinal: Negative for abdominal pain, diarrhea, nausea and vomiting  Musculoskeletal: Negative for arthralgias, joint swelling and myalgias  Skin: Negative for rash  Neurological: Negative for dizziness, weakness, numbness and headaches  Video Exam    Vitals:    01/30/23 0942   Pulse: 96   SpO2: 97%       Physical Exam  HENT:      Nose: Congestion present            I spent 12 minutes directly with the patient during this visit

## 2023-02-22 ENCOUNTER — OFFICE VISIT (OUTPATIENT)
Dept: FAMILY MEDICINE CLINIC | Facility: CLINIC | Age: 28
End: 2023-02-22

## 2023-02-22 VITALS
DIASTOLIC BLOOD PRESSURE: 74 MMHG | OXYGEN SATURATION: 98 % | HEART RATE: 94 BPM | HEIGHT: 65 IN | BODY MASS INDEX: 24.16 KG/M2 | WEIGHT: 145 LBS | TEMPERATURE: 98.3 F | SYSTOLIC BLOOD PRESSURE: 120 MMHG

## 2023-02-22 DIAGNOSIS — R05.2 SUBACUTE COUGH: Primary | ICD-10-CM

## 2023-02-22 RX ORDER — DEXTROMETHORPHAN HYDROBROMIDE 15 MG/1
1 TABLET ORAL DAILY
Qty: 30 TABLET | Refills: 0 | Status: SHIPPED | OUTPATIENT
Start: 2023-02-22

## 2023-02-22 RX ORDER — AZITHROMYCIN 500 MG/1
500 TABLET, FILM COATED ORAL DAILY
Qty: 5 TABLET | Refills: 0 | Status: SHIPPED | OUTPATIENT
Start: 2023-02-22 | End: 2023-02-27

## 2023-02-22 RX ORDER — FLUTICASONE PROPIONATE 50 MCG
1 SPRAY, SUSPENSION (ML) NASAL DAILY
Qty: 11.1 ML | Refills: 1 | Status: SHIPPED | OUTPATIENT
Start: 2023-02-22

## 2023-04-23 PROBLEM — R05.2 SUBACUTE COUGH: Status: RESOLVED | Noted: 2023-02-22 | Resolved: 2023-04-23

## 2023-04-27 ENCOUNTER — ROUTINE PRENATAL (OUTPATIENT)
Dept: OBGYN CLINIC | Facility: CLINIC | Age: 28
End: 2023-04-27

## 2023-04-27 VITALS — DIASTOLIC BLOOD PRESSURE: 80 MMHG | BODY MASS INDEX: 26.26 KG/M2 | WEIGHT: 157.8 LBS | SYSTOLIC BLOOD PRESSURE: 130 MMHG

## 2023-04-27 DIAGNOSIS — Z3A.35 35 WEEKS GESTATION OF PREGNANCY: ICD-10-CM

## 2023-04-27 DIAGNOSIS — O34.219 HISTORY OF CESAREAN DELIVERY, CURRENTLY PREGNANT: Primary | ICD-10-CM

## 2023-04-27 DIAGNOSIS — O09.299 HISTORY OF MACROSOMIA IN INFANT IN PRIOR PREGNANCY, CURRENTLY PREGNANT: ICD-10-CM

## 2023-04-27 DIAGNOSIS — O09.299 HISTORY OF PRE-ECLAMPSIA IN PRIOR PREGNANCY, CURRENTLY PREGNANT: ICD-10-CM

## 2023-04-27 NOTE — PROGRESS NOTES
32 y o  [id number] female at 35w2d (Estimated Date of Delivery: 23) for PNV  Pre- Vitals    Flowsheet Row Most Recent Value   Prenatal Assessment    Fetal Heart Rate 150   Movement Present   Prenatal Vitals    Blood Pressure 130/80   Weight - Scale 71 6 kg (157 lb 12 8 oz)   Urine Albumin/Glucose    Dilation/Effacement/Station    Vaginal Drainage    Edema          kg (17 lb 12 8 oz)    Leakage of fluid: no  Vaginal bleeding: no  Contractions/Cramping: no  Fetal movement: yes  GBS and GC/chlamydia due at next visit  Prior cs x 1 - desires RLTCS  Scheduled for 23 at 8am with Dr Garett Stubbs  Hx of preE dx during labor - BP normotensive today  Labor precautions reviewed  RTO in 1 weeks

## 2023-05-03 ENCOUNTER — ROUTINE PRENATAL (OUTPATIENT)
Dept: OBGYN CLINIC | Facility: CLINIC | Age: 28
End: 2023-05-03

## 2023-05-03 VITALS — WEIGHT: 157.2 LBS | SYSTOLIC BLOOD PRESSURE: 122 MMHG | DIASTOLIC BLOOD PRESSURE: 80 MMHG | BODY MASS INDEX: 26.16 KG/M2

## 2023-05-03 DIAGNOSIS — Z3A.36 36 WEEKS GESTATION OF PREGNANCY: ICD-10-CM

## 2023-05-03 DIAGNOSIS — Z98.891 HISTORY OF CESAREAN DELIVERY: Primary | ICD-10-CM

## 2023-05-03 NOTE — PROGRESS NOTES
32 y o  [id number] female at 36w1d (Estimated Date of Delivery: 23) for PNV      Pre- Vitals    Flowsheet Row Most Recent Value   Prenatal Assessment    Movement Present   Prenatal Vitals    Blood Pressure 122/80   Weight - Scale 71 3 kg (157 lb 3 2 oz)   Urine Albumin/Glucose    Dilation/Effacement/Station    Vaginal Drainage    Edema          kg (17 lb 3 2 oz)  Discussed repeat c/s  GBS and GC collected  Reviewed labor precautions

## 2023-05-04 DIAGNOSIS — B00.1 RECURRENT COLD SORES: ICD-10-CM

## 2023-05-04 LAB
C TRACH DNA SPEC QL NAA+PROBE: NEGATIVE
N GONORRHOEA DNA SPEC QL NAA+PROBE: NEGATIVE

## 2023-05-04 RX ORDER — VALACYCLOVIR HYDROCHLORIDE 500 MG/1
500 TABLET, FILM COATED ORAL 2 TIMES DAILY
Qty: 180 TABLET | Refills: 0 | Status: SHIPPED | OUTPATIENT
Start: 2023-05-04 | End: 2023-08-02

## 2023-05-05 LAB — GP B STREP DNA SPEC QL NAA+PROBE: NEGATIVE

## 2023-05-12 ENCOUNTER — ROUTINE PRENATAL (OUTPATIENT)
Dept: OBGYN CLINIC | Facility: CLINIC | Age: 28
End: 2023-05-12

## 2023-05-12 VITALS — WEIGHT: 156.8 LBS | DIASTOLIC BLOOD PRESSURE: 72 MMHG | BODY MASS INDEX: 26.09 KG/M2 | SYSTOLIC BLOOD PRESSURE: 120 MMHG

## 2023-05-12 DIAGNOSIS — Z3A.37 37 WEEKS GESTATION OF PREGNANCY: ICD-10-CM

## 2023-05-12 DIAGNOSIS — O34.219 HISTORY OF CESAREAN DELIVERY, CURRENTLY PREGNANT: Primary | ICD-10-CM

## 2023-05-12 NOTE — PROGRESS NOTES
Patient is a 33 YO Quirin Shorts female presenting to the office at 37 3 weeks for routine OB care     BP: 120/72  TWlb  Fetal Movement: yes good movement  Feeling well today  Denies LOF, CTX, VB  Reviewed process of RLTCS, NPO after midnight, arrival time, postoperative course  All questions answered  RLTCS scheduled  Call for concerns  RTO 1 week

## 2023-05-19 ENCOUNTER — ROUTINE PRENATAL (OUTPATIENT)
Dept: OBGYN CLINIC | Facility: CLINIC | Age: 28
End: 2023-05-19

## 2023-05-19 VITALS — SYSTOLIC BLOOD PRESSURE: 132 MMHG | DIASTOLIC BLOOD PRESSURE: 76 MMHG | WEIGHT: 159.6 LBS | BODY MASS INDEX: 26.56 KG/M2

## 2023-05-19 DIAGNOSIS — O09.299 HISTORY OF MACROSOMIA IN INFANT IN PRIOR PREGNANCY, CURRENTLY PREGNANT: ICD-10-CM

## 2023-05-19 DIAGNOSIS — O34.219 HISTORY OF CESAREAN DELIVERY, CURRENTLY PREGNANT: Primary | ICD-10-CM

## 2023-05-19 DIAGNOSIS — O09.299 HISTORY OF PRE-ECLAMPSIA IN PRIOR PREGNANCY, CURRENTLY PREGNANT: ICD-10-CM

## 2023-05-19 DIAGNOSIS — Z3A.38 38 WEEKS GESTATION OF PREGNANCY: ICD-10-CM

## 2023-05-19 NOTE — PROGRESS NOTES
32 y o  [id number] female at 38w3d (Estimated Date of Delivery: 23) for PNV  Pre- Vitals    Flowsheet Row Most Recent Value   Prenatal Assessment    Fetal Heart Rate 135   Movement Present   Prenatal Vitals    Blood Pressure 132/76   Weight - Scale 72 4 kg (159 lb 9 6 oz)   Urine Albumin/Glucose    Dilation/Effacement/Station    Vaginal Drainage    Edema          kg (19 lb 9 6 oz)    Leakage of fluid: no  Vaginal bleeding: no  Contractions/Cramping: yes -- reports pelvic pressure and discomfort  Fetal movement: yes  SVE 1/thick/-3, soft, midposition  Labor precautions reviewed  Discussed preoperative washing instructions with patient and hibiclens wash provided       RTO for postoperative incision check

## 2023-05-22 NOTE — PRE-PROCEDURE INSTRUCTIONS
Phone call to patient for pre-operative instructions prior to     Pt was instructed to arrive at 0600 2 hours before her scheduled OR time at 0800      Pt instructed to remain NPO after midnight  *This includes gum, water and hard candy  *If patient takes AM meds (e g hypertensive meds) they may take these meds with a sip of water  *This should not include medications like prenatal vitamins Aspirin or colace  Pt should brush their teeth as usual    Pt was instructed to buy and use a pre-surgical wash containing chlorhexidine the night before and the morning of her scheduled  and to wear clean clothing after the wash  Pt instructed not to shave operative area prior to surgery  Pt was asked not to wear any jewelry and to leave all of her valuables at home  Pt was asked to leave all of her larger bags and suitcases in the car to be brought in after she is assigned a post partum room  Pt was informed that she may have 1 support person in the OR/PACU area and of the current visiting policies

## 2023-05-23 ENCOUNTER — ANESTHESIA EVENT (INPATIENT)
Dept: LABOR AND DELIVERY | Facility: HOSPITAL | Age: 28
End: 2023-05-23

## 2023-05-23 ENCOUNTER — HOSPITAL ENCOUNTER (INPATIENT)
Facility: HOSPITAL | Age: 28
LOS: 2 days | Discharge: HOME | End: 2023-05-25
Attending: OBSTETRICS & GYNECOLOGY | Admitting: OBSTETRICS & GYNECOLOGY

## 2023-05-23 ENCOUNTER — ANESTHESIA (INPATIENT)
Dept: LABOR AND DELIVERY | Facility: HOSPITAL | Age: 28
End: 2023-05-23

## 2023-05-23 DIAGNOSIS — Z98.891 S/P REPEAT LOW TRANSVERSE C-SECTION: ICD-10-CM

## 2023-05-23 DIAGNOSIS — Z3A.39 39 WEEKS GESTATION OF PREGNANCY: ICD-10-CM

## 2023-05-23 DIAGNOSIS — Z3A.38 38 WEEKS GESTATION OF PREGNANCY: Primary | ICD-10-CM

## 2023-05-23 PROBLEM — Z34.90 CURRENTLY PREGNANT: Status: ACTIVE | Noted: 2023-05-23

## 2023-05-23 LAB
ABO GROUP BLD: NORMAL
BASE EXCESS BLDCOV CALC-SCNC: -3.2 MMOL/L (ref 1–9)
BLD GP AB SCN SERPL QL: NEGATIVE
ERYTHROCYTE [DISTWIDTH] IN BLOOD BY AUTOMATED COUNT: 14.5 % (ref 11.6–15.1)
HCO3 BLDCOV-SCNC: 21.9 MMOL/L (ref 12.2–28.6)
HCT VFR BLD AUTO: 36.2 % (ref 34.8–46.1)
HGB BLD-MCNC: 11.7 G/DL (ref 11.5–15.4)
HOLD SPECIMEN: NORMAL
MCH RBC QN AUTO: 30.2 PG (ref 26.8–34.3)
MCHC RBC AUTO-ENTMCNC: 32.3 G/DL (ref 31.4–37.4)
MCV RBC AUTO: 93 FL (ref 82–98)
OXYHGB MFR BLDCOV: 53.2 %
PCO2 BLDCOV: 39.5 MM HG (ref 27–43)
PH BLDCOV: 7.36 [PH] (ref 7.19–7.49)
PLATELET # BLD AUTO: 329 THOUSANDS/UL (ref 149–390)
PMV BLD AUTO: 9.2 FL (ref 8.9–12.7)
PO2 BLDCOV: 22.7 MM HG (ref 15–45)
RBC # BLD AUTO: 3.88 MILLION/UL (ref 3.81–5.12)
RH BLD: POSITIVE
RPR SER QL: NORMAL
SAO2 % BLDCOV: 10.4 ML/DL
SPECIMEN EXPIRATION DATE: NORMAL
TREPONEMA PALLIDUM IGG+IGM AB [PRESENCE] IN SERUM OR PLASMA BY IMMUNOASSAY: REACTIVE
WBC # BLD AUTO: 12.76 THOUSAND/UL (ref 4.31–10.16)

## 2023-05-23 PROCEDURE — 4A1HXCZ MONITORING OF PRODUCTS OF CONCEPTION, CARDIAC RATE, EXTERNAL APPROACH: ICD-10-PCS | Performed by: OBSTETRICS & GYNECOLOGY

## 2023-05-23 RX ORDER — CEFAZOLIN SODIUM 1 G/50ML
1000 SOLUTION INTRAVENOUS ONCE
Status: DISCONTINUED | OUTPATIENT
Start: 2023-05-23 | End: 2023-05-23

## 2023-05-23 RX ORDER — KETOROLAC TROMETHAMINE 30 MG/ML
15 INJECTION, SOLUTION INTRAMUSCULAR; INTRAVENOUS EVERY 6 HOURS SCHEDULED
Status: COMPLETED | OUTPATIENT
Start: 2023-05-23 | End: 2023-05-24

## 2023-05-23 RX ORDER — CALCIUM CARBONATE 500 MG/1
1000 TABLET, CHEWABLE ORAL DAILY PRN
Status: DISCONTINUED | OUTPATIENT
Start: 2023-05-23 | End: 2023-05-25 | Stop reason: HOSPADM

## 2023-05-23 RX ORDER — BUPIVACAINE HYDROCHLORIDE 7.5 MG/ML
INJECTION, SOLUTION INTRASPINAL AS NEEDED
Status: DISCONTINUED | OUTPATIENT
Start: 2023-05-23 | End: 2023-05-23

## 2023-05-23 RX ORDER — IBUPROFEN 600 MG/1
600 TABLET ORAL EVERY 6 HOURS SCHEDULED
Status: DISCONTINUED | OUTPATIENT
Start: 2023-05-18 | End: 2023-05-23

## 2023-05-23 RX ORDER — CEFAZOLIN SODIUM 2 G/50ML
SOLUTION INTRAVENOUS AS NEEDED
Status: DISCONTINUED | OUTPATIENT
Start: 2023-05-23 | End: 2023-05-23

## 2023-05-23 RX ORDER — DIAPER,BRIEF,INFANT-TODD,DISP
1 EACH MISCELLANEOUS DAILY PRN
Status: DISCONTINUED | OUTPATIENT
Start: 2023-05-23 | End: 2023-05-23

## 2023-05-23 RX ORDER — DIAPER,BRIEF,INFANT-TODD,DISP
1 EACH MISCELLANEOUS DAILY PRN
Status: DISCONTINUED | OUTPATIENT
Start: 2023-05-23 | End: 2023-05-25 | Stop reason: HOSPADM

## 2023-05-23 RX ORDER — IBUPROFEN 600 MG/1
600 TABLET ORAL EVERY 6 HOURS SCHEDULED
Status: DISCONTINUED | OUTPATIENT
Start: 2023-05-25 | End: 2023-05-25 | Stop reason: HOSPADM

## 2023-05-23 RX ORDER — ONDANSETRON 2 MG/ML
4 INJECTION INTRAMUSCULAR; INTRAVENOUS EVERY 8 HOURS PRN
Status: DISCONTINUED | OUTPATIENT
Start: 2023-05-23 | End: 2023-05-23

## 2023-05-23 RX ORDER — ONDANSETRON 2 MG/ML
4 INJECTION INTRAMUSCULAR; INTRAVENOUS ONCE AS NEEDED
Status: DISCONTINUED | OUTPATIENT
Start: 2023-05-23 | End: 2023-05-25 | Stop reason: HOSPADM

## 2023-05-23 RX ORDER — CALCIUM CARBONATE 500 MG/1
1000 TABLET, CHEWABLE ORAL DAILY PRN
Status: DISCONTINUED | OUTPATIENT
Start: 2023-05-23 | End: 2023-05-23 | Stop reason: SDUPTHER

## 2023-05-23 RX ORDER — ACETAMINOPHEN 325 MG/1
975 TABLET ORAL EVERY 6 HOURS SCHEDULED
Status: DISCONTINUED | OUTPATIENT
Start: 2023-05-23 | End: 2023-05-23 | Stop reason: SDUPTHER

## 2023-05-23 RX ORDER — DOCUSATE SODIUM 100 MG/1
100 CAPSULE, LIQUID FILLED ORAL 2 TIMES DAILY
Status: DISCONTINUED | OUTPATIENT
Start: 2023-05-23 | End: 2023-05-25 | Stop reason: HOSPADM

## 2023-05-23 RX ORDER — FENTANYL CITRATE/PF 50 MCG/ML
25 SYRINGE (ML) INJECTION
Status: DISCONTINUED | OUTPATIENT
Start: 2023-05-23 | End: 2023-05-25 | Stop reason: HOSPADM

## 2023-05-23 RX ORDER — DOCUSATE SODIUM 100 MG/1
100 CAPSULE, LIQUID FILLED ORAL 2 TIMES DAILY
Status: DISCONTINUED | OUTPATIENT
Start: 2023-05-23 | End: 2023-05-23

## 2023-05-23 RX ORDER — OXYTOCIN/RINGER'S LACTATE 30/500 ML
62.5 PLASTIC BAG, INJECTION (ML) INTRAVENOUS ONCE
Status: COMPLETED | OUTPATIENT
Start: 2023-05-23 | End: 2023-05-23

## 2023-05-23 RX ORDER — OXYTOCIN/RINGER'S LACTATE 30/500 ML
PLASTIC BAG, INJECTION (ML) INTRAVENOUS CONTINUOUS PRN
Status: DISCONTINUED | OUTPATIENT
Start: 2023-05-23 | End: 2023-05-23

## 2023-05-23 RX ORDER — DIPHENHYDRAMINE HCL 25 MG
25 TABLET ORAL EVERY 6 HOURS PRN
Status: DISCONTINUED | OUTPATIENT
Start: 2023-05-23 | End: 2023-05-23

## 2023-05-23 RX ORDER — ONDANSETRON 2 MG/ML
INJECTION INTRAMUSCULAR; INTRAVENOUS AS NEEDED
Status: DISCONTINUED | OUTPATIENT
Start: 2023-05-23 | End: 2023-05-23

## 2023-05-23 RX ORDER — VALACYCLOVIR HYDROCHLORIDE 500 MG/1
1000 TABLET, FILM COATED ORAL EVERY 12 HOURS SCHEDULED
Status: DISCONTINUED | OUTPATIENT
Start: 2023-05-23 | End: 2023-05-25 | Stop reason: HOSPADM

## 2023-05-23 RX ORDER — HYDROMORPHONE HCL/PF 1 MG/ML
0.5 SYRINGE (ML) INJECTION EVERY 2 HOUR PRN
Status: DISCONTINUED | OUTPATIENT
Start: 2023-05-23 | End: 2023-05-25 | Stop reason: HOSPADM

## 2023-05-23 RX ORDER — SODIUM CHLORIDE, SODIUM LACTATE, POTASSIUM CHLORIDE, CALCIUM CHLORIDE 600; 310; 30; 20 MG/100ML; MG/100ML; MG/100ML; MG/100ML
125 INJECTION, SOLUTION INTRAVENOUS CONTINUOUS
Status: DISCONTINUED | OUTPATIENT
Start: 2023-05-23 | End: 2023-05-23 | Stop reason: SDUPTHER

## 2023-05-23 RX ORDER — ACETAMINOPHEN 325 MG/1
975 TABLET ORAL EVERY 6 HOURS SCHEDULED
Status: DISCONTINUED | OUTPATIENT
Start: 2023-05-23 | End: 2023-05-25 | Stop reason: HOSPADM

## 2023-05-23 RX ORDER — KETOROLAC TROMETHAMINE 30 MG/ML
15 INJECTION, SOLUTION INTRAMUSCULAR; INTRAVENOUS EVERY 6 HOURS SCHEDULED
Status: DISCONTINUED | OUTPATIENT
Start: 2023-05-23 | End: 2023-05-23 | Stop reason: SDUPTHER

## 2023-05-23 RX ORDER — ONDANSETRON 2 MG/ML
4 INJECTION INTRAMUSCULAR; INTRAVENOUS EVERY 8 HOURS PRN
Status: DISCONTINUED | OUTPATIENT
Start: 2023-05-23 | End: 2023-05-23 | Stop reason: SDUPTHER

## 2023-05-23 RX ORDER — HYDROMORPHONE HCL/PF 1 MG/ML
0.4 SYRINGE (ML) INJECTION
Status: DISCONTINUED | OUTPATIENT
Start: 2023-05-23 | End: 2023-05-25 | Stop reason: HOSPADM

## 2023-05-23 RX ORDER — ONDANSETRON 2 MG/ML
4 INJECTION INTRAMUSCULAR; INTRAVENOUS EVERY 8 HOURS PRN
Status: DISCONTINUED | OUTPATIENT
Start: 2023-05-23 | End: 2023-05-25 | Stop reason: HOSPADM

## 2023-05-23 RX ORDER — DIPHENHYDRAMINE HCL 25 MG
25 TABLET ORAL EVERY 6 HOURS PRN
Status: DISCONTINUED | OUTPATIENT
Start: 2023-05-23 | End: 2023-05-25 | Stop reason: HOSPADM

## 2023-05-23 RX ORDER — SODIUM CHLORIDE, SODIUM LACTATE, POTASSIUM CHLORIDE, CALCIUM CHLORIDE 600; 310; 30; 20 MG/100ML; MG/100ML; MG/100ML; MG/100ML
125 INJECTION, SOLUTION INTRAVENOUS CONTINUOUS
Status: DISCONTINUED | OUTPATIENT
Start: 2023-05-23 | End: 2023-05-23

## 2023-05-23 RX ORDER — MORPHINE SULFATE 0.5 MG/ML
INJECTION, SOLUTION EPIDURAL; INTRATHECAL; INTRAVENOUS AS NEEDED
Status: DISCONTINUED | OUTPATIENT
Start: 2023-05-23 | End: 2023-05-23

## 2023-05-23 RX ORDER — NALOXONE HYDROCHLORIDE 0.4 MG/ML
0.1 INJECTION, SOLUTION INTRAMUSCULAR; INTRAVENOUS; SUBCUTANEOUS
Status: ACTIVE | OUTPATIENT
Start: 2023-05-23 | End: 2023-05-24

## 2023-05-23 RX ORDER — OXYTOCIN/RINGER'S LACTATE 30/500 ML
62.5 PLASTIC BAG, INJECTION (ML) INTRAVENOUS ONCE
Status: DISCONTINUED | OUTPATIENT
Start: 2023-05-23 | End: 2023-05-23

## 2023-05-23 RX ORDER — NALBUPHINE HYDROCHLORIDE 10 MG/ML
5 INJECTION, SOLUTION INTRAMUSCULAR; INTRAVENOUS; SUBCUTANEOUS
Status: DISCONTINUED | OUTPATIENT
Start: 2023-05-23 | End: 2023-05-25 | Stop reason: HOSPADM

## 2023-05-23 RX ORDER — SODIUM CHLORIDE, SODIUM LACTATE, POTASSIUM CHLORIDE, CALCIUM CHLORIDE 600; 310; 30; 20 MG/100ML; MG/100ML; MG/100ML; MG/100ML
125 INJECTION, SOLUTION INTRAVENOUS CONTINUOUS
Status: DISCONTINUED | OUTPATIENT
Start: 2023-05-23 | End: 2023-05-25 | Stop reason: HOSPADM

## 2023-05-23 RX ORDER — FENTANYL CITRATE 50 UG/ML
INJECTION, SOLUTION INTRAMUSCULAR; INTRAVENOUS AS NEEDED
Status: DISCONTINUED | OUTPATIENT
Start: 2023-05-23 | End: 2023-05-23

## 2023-05-23 RX ORDER — DIPHENHYDRAMINE HYDROCHLORIDE 50 MG/ML
12.5 INJECTION INTRAMUSCULAR; INTRAVENOUS ONCE AS NEEDED
Status: DISCONTINUED | OUTPATIENT
Start: 2023-05-23 | End: 2023-05-25 | Stop reason: HOSPADM

## 2023-05-23 RX ADMIN — Medication 250 MILLI-UNITS/MIN: at 08:46

## 2023-05-23 RX ADMIN — VALACYCLOVIR 1000 MG: 500 TABLET, FILM COATED ORAL at 23:45

## 2023-05-23 RX ADMIN — ONDANSETRON 4 MG: 2 INJECTION INTRAMUSCULAR; INTRAVENOUS at 11:20

## 2023-05-23 RX ADMIN — BUPIVACAINE HYDROCHLORIDE IN DEXTROSE 1.6 ML: 7.5 INJECTION, SOLUTION SUBARACHNOID at 08:25

## 2023-05-23 RX ADMIN — CEFAZOLIN SODIUM 1000 MG: 2 SOLUTION INTRAVENOUS at 08:32

## 2023-05-23 RX ADMIN — FENTANYL CITRATE 15 MCG: 50 INJECTION, SOLUTION INTRAMUSCULAR; INTRAVENOUS at 08:25

## 2023-05-23 RX ADMIN — ACETAMINOPHEN 975 MG: 325 TABLET, FILM COATED ORAL at 20:50

## 2023-05-23 RX ADMIN — SODIUM CHLORIDE, SODIUM LACTATE, POTASSIUM CHLORIDE, AND CALCIUM CHLORIDE 125 ML/HR: .6; .31; .03; .02 INJECTION, SOLUTION INTRAVENOUS at 12:26

## 2023-05-23 RX ADMIN — SODIUM CHLORIDE, SODIUM LACTATE, POTASSIUM CHLORIDE, AND CALCIUM CHLORIDE 125 ML/HR: .6; .31; .03; .02 INJECTION, SOLUTION INTRAVENOUS at 07:33

## 2023-05-23 RX ADMIN — ONDANSETRON 4 MG: 2 INJECTION INTRAMUSCULAR; INTRAVENOUS at 08:50

## 2023-05-23 RX ADMIN — MORPHINE SULFATE 0.15 MG: 0.5 INJECTION, SOLUTION EPIDURAL; INTRATHECAL; INTRAVENOUS at 08:25

## 2023-05-23 RX ADMIN — KETOROLAC TROMETHAMINE 15 MG: 30 INJECTION, SOLUTION INTRAMUSCULAR; INTRAVENOUS at 23:45

## 2023-05-23 RX ADMIN — ACETAMINOPHEN 975 MG: 325 TABLET, FILM COATED ORAL at 14:33

## 2023-05-23 RX ADMIN — KETOROLAC TROMETHAMINE 30 MG: 30 INJECTION, SOLUTION INTRAMUSCULAR at 09:47

## 2023-05-23 RX ADMIN — PHENYLEPHRINE HYDROCHLORIDE 50 MCG/MIN: 10 INJECTION INTRAVENOUS at 08:26

## 2023-05-23 RX ADMIN — SODIUM CHLORIDE, SODIUM LACTATE, POTASSIUM CHLORIDE, AND CALCIUM CHLORIDE 999 ML/HR: .6; .31; .03; .02 INJECTION, SOLUTION INTRAVENOUS at 06:50

## 2023-05-23 RX ADMIN — Medication 62.5 MILLI-UNITS/MIN: at 09:59

## 2023-05-23 RX ADMIN — KETOROLAC TROMETHAMINE 15 MG: 30 INJECTION, SOLUTION INTRAMUSCULAR; INTRAVENOUS at 17:16

## 2023-05-23 NOTE — ANESTHESIA PREPROCEDURE EVALUATION
Procedure:   SECTION () REPEAT (Uterus)    Relevant Problems   ANESTHESIA  (+) Bradycardia with nitrous oxide      CARDIO   (-) HTN (hypertension)      ENDO   (-) Diabetes mellitus, type 2 (HCC)   (-) Hyperthyroidism   (-) Hypothyroidism      GI/HEPATIC (within normal limits)      /RENAL (within normal limits)      GYN  31 yo  at 39w0d weeks gestation who is being admitted for repeat  section   (+) Currently pregnant      HEMATOLOGY (within normal limits)      MUSCULOSKELETAL (within normal limits)      NEURO/PSYCH   (+) Anxiety   (+) History of macrosomia in infant in prior pregnancy, currently pregnant   (+) History of pre-eclampsia in prior pregnancy, currently pregnant      PULMONARY   (-) Asthma   (-) Sleep apnea        Physical Exam    Airway    Mallampati score: II  TM Distance: >3 FB  Neck ROM: full     Dental   No notable dental hx     Cardiovascular      Pulmonary      Other Findings        Anesthesia Plan  ASA Score- 2     Anesthesia Type- spinal with ASA Monitors  Additional Monitors:   Airway Plan:           Plan Factors-Exercise tolerance (METS): >4 METS  Chart reviewed  Existing labs reviewed  Patient summary reviewed  Patient is not a current smoker  Induction-     Postoperative Plan- Plan for postoperative opioid use  Informed Consent- Anesthetic plan and risks discussed with patient and spouse  I personally reviewed this patient with the CRNA  Discussed and agreed on the Anesthesia Plan with the CRNA  Mike Adamson

## 2023-05-23 NOTE — PLAN OF CARE
Problem: POSTPARTUM  Goal: Experiences normal postpartum course  Description: INTERVENTIONS:  - Monitor maternal vital signs  - Assess uterine involution and lochia  Outcome: Progressing  Goal: Appropriate maternal -  bonding  Description: INTERVENTIONS:  - Identify family support  - Assess for appropriate maternal/infant bonding   -Encourage maternal/infant bonding opportunities  - Referral to  or  as needed  Outcome: Progressing  Goal: Establishment of infant feeding pattern  Description: INTERVENTIONS:  - Assess breast/bottle feeding  - Refer to lactation as needed  Outcome: Progressing  Goal: Incision(s), wounds(s) or drain site(s) healing without S/S of infection  Description: INTERVENTIONS  - Assess and document dressing, incision, wound bed, drain sites and surrounding tissue  - Provide patient and family education  - Perform skin care/dressing changes  Outcome: Progressing     Problem: PAIN - ADULT  Goal: Verbalizes/displays adequate comfort level or baseline comfort level  Description: Interventions:  - Encourage patient to monitor pain and request assistance  - Assess pain using appropriate pain scale  - Administer analgesics based on type and severity of pain and evaluate response  - Implement non-pharmacological measures as appropriate and evaluate response  - Consider cultural and social influences on pain and pain management  - Notify physician/advanced practitioner if interventions unsuccessful or patient reports new pain  Outcome: Progressing     Problem: INFECTION - ADULT  Goal: Absence or prevention of progression during hospitalization  Description: INTERVENTIONS:  - Assess and monitor for signs and symptoms of infection  - Monitor lab/diagnostic results  - Monitor all insertion sites, i e  indwelling lines, tubes, and drains  - Monitor endotracheal if appropriate and nasal secretions for changes in amount and color  - Crocker appropriate cooling/warming therapies per order  - Administer medications as ordered  - Instruct and encourage patient and family to use good hand hygiene technique  - Identify and instruct in appropriate isolation precautions for identified infection/condition  Outcome: Progressing  Goal: Absence of fever/infection during neutropenic period  Description: INTERVENTIONS:  - Monitor WBC    Outcome: Progressing     Problem: Knowledge Deficit  Goal: Patient/family/caregiver demonstrates understanding of disease process, treatment plan, medications, and discharge instructions  Description: Complete learning assessment and assess knowledge base    Interventions:  - Provide teaching at level of understanding  - Provide teaching via preferred learning methods  Outcome: Progressing     Problem: DISCHARGE PLANNING  Goal: Discharge to home or other facility with appropriate resources  Description: INTERVENTIONS:  - Identify barriers to discharge w/patient and caregiver  - Arrange for needed discharge resources and transportation as appropriate  - Identify discharge learning needs (meds, wound care, etc )  - Arrange for interpretive services to assist at discharge as needed  - Refer to Case Management Department for coordinating discharge planning if the patient needs post-hospital services based on physician/advanced practitioner order or complex needs related to functional status, cognitive ability, or social support system  Outcome: Progressing

## 2023-05-23 NOTE — LACTATION NOTE
This note was copied from a baby's chart  CONSULT - LACTATION  Baby Girl Delpha Needs) Sebastien 0 days female MRN: [id number]    [facility] NURSERY Room / Bed: (N)/(N) Encounter: [id number]    Maternal Information     MOTHER:  Rubina Crowe  Maternal Age: 32 y o    OB History: # 1 - Date: 20, Sex: None, Weight: None, GA: 10w0d, Delivery: None, Apgar1: None, Apgar5: None, Living: Fetal Demise, Birth Comments: incomplete , D&C    # 2 - Date: 2020, Sex: None, Weight: None, GA: 6w0d, Delivery: None, Apgar1: None, Apgar5: None, Living: None, Birth Comments: None    # 3 - Date: 21, Sex: Male, Weight: 4040 g (8 lb 14 5 oz), GA: 40w4d, Delivery: , Low Transverse, Apgar1: 8, Apgar5: 9, Living: Living, Birth Comments: None    # 4 - Date: 23, Sex: Female, Weight: 3485 g (7 lb 10 9 oz), GA: 39w0d, Delivery: , Low Transverse, Apgar1: 8, Apgar5: 9, Living: Living, Birth Comments: None   Previouse breast reduction surgery? No    Lactation history:   Has patient previously breast fed: Yes   How long had patient previously breast fed: 4 months   Previous breast feeding complications: Infant separation (return to work)     Past Surgical History:   Procedure Laterality Date   • ADENOIDECTOMY     •  SECTION  2021   • KNEE ARTHROSCOPY Left    • TONSILLECTOMY     • WISDOM TOOTH EXTRACTION          Birth information:  YOB: 2025   Time of birth: 8:45 AM   Sex: female   Delivery type: , Low Transverse   Birth Weight: 3485 g (7 lb 10 9 oz)   Percent of Weight Change: 0%     Gestational Age: 39w0d   [unfilled]    Assessment     Breast and nipple assessment: normal assessment     Assessment: normal assessment    Feeding assessment: feeding well after delivery;  Mom working on consistent deep latches    LATCH:  Latch: Repeated attempts, hold nipple in mouth, stimulate to suck   Audible Swallowing: A few with stimulation   Type of Nipple: Everted (After stimulation)   Comfort (Breast/Nipple): Soft/non-tender   Hold (Positioning): Partial assist, teach one side, mother does other, staff holds   [facility] Score: 7          Feeding recommendations:  breast feed on demand     Met with mother & father  Provided  with Ready, Set, Baby booklet which contained information on:  Hand expression with access to QR codes to review hand expression  Positioning and latch reviewed as well as showing images of other feeding positions  Discussed the properties of a good latch in any position  Mom asked for assistance positioning baby at right breast using cross cradle positioning  At first guided to shallow latch then hand over hand assistance to deeper latch  Mom seemed tired but pleased with session  Feeding on cue and what that means for recognizing infant's hunger, s/s that baby is getting enough milk and some s/s that breastfeeding dyad may need further help      Skin to Skin contact an benefits to mom and baby  Avoidance of pacifiers for the first month discussed  Gave information on common concerns, what to expect the first few weeks after delivery, preparing for other caregivers, and how partners can help  Resources for support also provided  Also reviewed  discharge breastfeeding booklet including the feeding log  Emphasized 8 or more (12) feedings in a 24 hour period, what to expect for the number of diapers per day of life and the progression of properties of the  stooling pattern  Reviewed breastfeeding and your lifestyle, storage and preparation of breast milk, how to keep you breast pump clean, the employed breastfeeding mother and paced bottle feeding handouts  Booklet included Breastfeeding Resources for after discharge including access to the number for the [address]  Encouraged parents to call for assistance, questions, and concerns about breastfeeding    Extension provided          Keyon Kerr RN 5/23/2023 12:29 PM

## 2023-05-23 NOTE — DISCHARGE SUMMARY
Discharge Summary - OB/GYN   Tennille Lieberman 32 y o  female MRN: [id number]  Unit/Bed#: LD PACU- Encounter: [id number]      Admission Date: 2023     Discharge Date: 23    Admitting Diagnosis:   1  Pregnancy at 39w0d  2  Hx of  section    Discharge Diagnosis:   Same, delivered  RLTCS    Procedures: repeat  section, low transverse incision    Delivering Attending: Leila Helm MD  Discharge Attending: Kira Olivera Course:     Tennille Lieberman is a 32 y o  [id number] at 39w0d wks who was initially admitted for repeat low transverse  section by maternal request      She delivered a viable female  on 23 at [address]  Weight 8lbs 9oz via repeat  section, low transverse incision  Apgars were 8 (1 min) and 9 (5 min)   was transferred to  nursery  Patient tolerated the procedure well and was transferred to recovery in stable condition  Her post-operative course was uncomplicated  Preoperative hemaglobin was 11 7, postoperative was 9 8 after which she received one dose of Venofer  Her postoperative pain was well controlled with oral analgesics  On day of discharge, she was ambulating and able to reasonably perform all ADLs  She was voiding and had appropriate bowel function  Pain was well controlled  She was discharged home on post-operative day #2 without complications  Patient was instructed to follow up with her OB as an outpatient and was given appropriate warnings to call provider if she develops signs of infection or uncontrolled pain  Complications: none apparent    Condition at discharge: good     Discharge instructions/Information to patient and family:   See after visit summary for information provided to patient and family  Provisions for Follow-Up Care:  See after visit summary for information related to follow-up care and any pertinent home health orders        Disposition: Home    Planned Readmission: No    Discharge Medications: For a complete list of the patient's medications, please refer to her med rec

## 2023-05-23 NOTE — OP NOTE
OPERATIVE REPORT  PATIENT NAME: Jacque Honeycutt    :  1995  MRN: [id number]  Pt Location: [facility] L&D OR ROOM 02    SURGERY DATE: 2023    Surgeon(s) and Role:     * Pawan Thibodeaux MD - Primary     * Yennifer Caldera MD - Assisting    Preop Diagnosis:  S/P repeat low transverse  [Z98 891]  44 weeks gestation of pregnancy [Z3A 39]    Post-Op Diagnosis Codes:     * S/P repeat low transverse  [Z98 891]     * 39 weeks gestation of pregnancy [Z3A 39]    Procedure(s) (LRB):   SECTION () REPEAT (N/A)    Specimen(s):  ID Type Source Tests Collected by Time Destination   A :  Cord Blood Cord BLOOD GAS, VENOUS, CORD, BLOOD GAS, ARTERIAL, CORD Pawan Thibodeaux MD 2023 5126    B :  Tissue (Placenta on Hold) OB Only Placenta PLACENTA IN STORAGE Pawan Thibodeaux MD 2023 5012        Surgical QBL:  Surgical QBL (mL): 165 mL      Drains:  Urethral Catheter Non-latex;Straight-tip 16 Fr  (Active)   Reasons to continue Urinary Catheter  Post-operative urological requirements 23 0836   Goal for Removal Remove POD#1 23 0836   Site Assessment Clean;Skin intact 23 0836   Borden Care Done 23 0836   Collection Container Standard drainage bag 23 0836   Number of days: 0       Procedure and Technique:   Section Procedure Note    Indications:   Maternal request for repeat  section    Pre-operative Diagnosis:   39w0d pregnancy  Prior  section    Post-operative Diagnosis:   RLTCS     Attending: Pawan Thibodeaux MD  Resident: Yennifer Caldera MD    Maternal Findings:  Normal uterus  Normal tubes and ovaries bilaterally  Adhesions noted at rectus fascia  No difficulty noted from skin to delivery     Findings:  Viable female weighing 7lbs 10oz; Apgar scores of 8 at one minute and 9 at five minutes     Clear amniotic fluid  Normal placenta with 3-vessel cord    Arterial and Venous Gases:  Umbilical Cord Venous Blood Gas:  Results from last 7 days   Lab Units 23  0846   PH COV  7 362   PCO2 COV mm HG 39 5   HCO3 COV mmol/L 21 9   BASE EXC COV mmol/L -3 2*   O2 CT CD VB mL/dL 10 4   O2 HGB, VENOUS CORD % 91 2     Umbilical Cord Arterial Blood Gas:        Specimens: Arterial and venous cord gases, cord blood, segment of umbilical cord, placenta to storage    Quantitative Blood Loss: 165 mL    Drains: Borden catheter           Complications:  None; patient tolerated the procedure well  Disposition: PACU            Condition: stable    Procedure Details   The patient was seen prior to the procedure  Risks, benefits, possible complications, alternate treatment options, and expected outcomes were discussed with the patient  The patient agreed with the proposed plan and gave informed consent for a RLTCS  The patient was taken to the [facility] Operating Room at [phone] where she received spinal anesthesia  For infection prophylaxis, she received 1g ancef  preoperatively  Fetal heart tones in the OR were assessed and noted to be within normal limits and a Borden catheter and SCDs were placed  The abdomen was prepped with Chloraprep, the vagina was prepped with Chlorhexidine, and following appropriate drying time, the patient was draped in the usual sterile manner  A Time Out was held and the above information confirmed  The patient was identified as Juarez Gaona and the procedure verified as a  Delivery for maternal request     A Pfannenstiel incision was made and carried down through the underlying subcutaneous tissue to the fascia using a scalpel  The rectus fascia was dissected laterally using Huang scissors  The superior edge of the  fascial incision was grasped with Kocher clamps bilaterally, tented upward and the underlying rectus muscles were dissected off sharply with Huang scissors  This was repeated on the inferior edge of the fascia and dissected down to the pubic rami    The rectus muscles were  and the peritoneum was identified, entered, and extended longitudinally with blunt dissection  The vesicouterine peritoneum was identified, entered sharply, and dissected laterally with  Metzenbaum scissors to form a bladder flap  The bladder blade was repositioned  A low transverse uterine incision was made with the scalpel and extended laterally with blunt dissection  The amnion was entered bluntly  The fetal head was palpated, elevated, and delivered through the uterine incision followed by the body without difficulty  Time of birth was noted at 46  A nuchal cord was noted and reduced after delivery  There was noted to be spontaneous cry and good tone  There was no apparent injury to the   The umbilical cord was doubly clamped and cut after 30 seconds to allow for delayed cord clamping  The infant was handed off to the  providers  Arterial and venous cord gases, cord blood, and a segment of umbilical cord were obtained for evaluation  The placenta delivered spontaneously at 8104 with uterine fundal massage and appeared normal  The uterus was exteriorized and cleaned out with a moist lap sponge  The uterine incision was closed with a running locked suture of 0 Vicryl  A second layer of the same suture was used to imbricate the first   Hemostasis was noted to be excellent  The uterus was returned to the abdomen  The paracolic gutters were inspected and cleared of all clots and debris with irrigation and moist lap sponges  The incision was hemostatic but looked raw; Surgicel snow was applied   The rectus abdominis and fascia appeared raw as well, and Surgicel was applied at this layer  The fascia was closed with a running suture of 0 Vicryl to midline starting from the patient's left  This was repeated starting from the right side  Adhesions were noted at the midline fascia, requiring use of Kocher to grasp underlying fascia to close it   The skin was closed with a subcuticular running suture of 4-0 Monocryl  A pressure dressing was applied followed by abdominal binder  The patient appeared to tolerate the procedure very well  Lap sponge, needle, and instrument counts were correct x2  The patient's fundus was palpated and the uterus was expressed  She was then cleaned and transferred to her postpartum recovery room in stable condition and her infant went to the  nursery  Attending Attestation: Dr Simeon Echeverria MD was present for the entire procedure    Simeon Bingham MD  OB/GYN PGY-1  2023 9:45 AM                  SIGNATURE: Simeon Bingham MD  DATE: May 23, 2023  TIME: 9:45 AM

## 2023-05-23 NOTE — H&P
H&P Exam - Obstetrics   Hallie Tate 32 y o  female MRN: [id number]  Unit/Bed#: LD PACU-03 Encounter: [id number]      History of Present Illness     Chief Complaint:     HPI:  Hallie Tate is a 32 y o   female with an JOSE CARLOS of 2023, by Last Menstrual Period at 39w0d weeks gestation who is being admitted for RLTCS  Contractions: no  Loss of fluid: no  Vaginal bleeding: no  Fetal movement: yes    She is a [facility] patient  PREGNANCY COMPLICATIONS:   1) Hx of pre-eclampsia    OB History    Para Term  AB Living   4 1 1 0 2 1   SAB IAB Ectopic Multiple Live Births   2 0 0 0 1      # Outcome Date GA Lbr Deo/2nd Weight Sex Delivery Anes PTL Lv   4 Current            3 Term 21 40w4d / 03:18 4040 g (8 lb 14 5 oz) M CS-LTranv EPI N RODOLFO      Complications: Failure to Progress in Second Stage, Chorioamnionitis   2 SAB 2020 6w0d          1 SAB 20 10w0d       FD      Birth Comments: incomplete , D&C      Obstetric Comments   Pt reports miscarried x 2 (2020, 2020)       Baby complications/comments: none    Review of Systems   Constitutional: Negative for chills and fever  Eyes: Negative for visual disturbance  Respiratory: Negative for chest tightness and shortness of breath  Cardiovascular: Negative for chest pain and palpitations  Gastrointestinal: Negative for abdominal pain  Genitourinary: Negative for vaginal bleeding, vaginal discharge and vaginal pain  Musculoskeletal: Negative for back pain  Neurological: Negative for headaches  Historical Information   Past Medical History:   Diagnosis Date   • Anxiety 02/10/2020   • Epidermoid cyst of skin 2017   • Impetigo 2018   • Miscarriage 2020    Last Assessment & Plan:  Treated w Cytotec then d+e,  Advised about pelvic rest for 2 wks Continue pnv , folic acid if she is planning to get pregnant again  Call if any abnormal bleeding or pain     • Preeclampsia, severe 2021   • Sleep disturbances 2018     Past Surgical History:   Procedure Laterality Date   • ADENOIDECTOMY     •  SECTION  2021   • KNEE ARTHROSCOPY Left    • TONSILLECTOMY     • WISDOM TOOTH EXTRACTION       Social History   Social History     Substance and Sexual Activity   Alcohol Use Never     Social History     Substance and Sexual Activity   Drug Use Never     Social History     Tobacco Use   Smoking Status Never   Smokeless Tobacco Never     Family History: non-contributory    Meds/Allergies    {  Medications Prior to Admission   Medication   • Ascorbic Acid (vitamin C) 100 MG tablet   • LYSINE PO   • Prenatal Vit-Fe Fumarate-FA (PRENATAL VITAMIN PO)   • valACYclovir (VALTREX) 500 mg tablet        Allergies   Allergen Reactions   • Amoxicillin-Pot Clavulanate GI Intolerance     Other reaction(s): Unknown Reaction  Other reaction(s): STOMACH UPSET  Reaction when pt was toddler  Other reaction(s): STOMACH UPSET  Reaction when pt was toddler  Other reaction(s): Unknown Reaction  Other reaction(s): STOMACH UPSET  Reaction when pt was toddler       OBJECTIVE:    Vitals:   LMP 2022   There is no height or weight on file to calculate BMI  Physical Exam  HENT:      Head: Normocephalic  Eyes:      Conjunctiva/sclera: Conjunctivae normal    Cardiovascular:      Rate and Rhythm: Normal rate  Pulses: Normal pulses  Pulmonary:      Effort: Pulmonary effort is normal    Abdominal:      Palpations: Abdomen is soft  Tenderness: There is no abdominal tenderness  Skin:     General: Skin is warm  Capillary Refill: Capillary refill takes less than 2 seconds  Neurological:      Mental Status: She is alert and oriented to person, place, and time     Psychiatric:         Mood and Affect: Mood normal          Fetal heart rate:   140 bpm with moderate variability, accelerations, no decelerations    [place]:   none    Prenatal Labs:   Blood Type: O   D (Rh type): positive  , Antibody Screen: negative   HCT/HGB:   Lab Results   Component Value Date/Time    Hematocrit 36 2 2023 06:53 AM    Hematocrit 42 1 2018 10:24 AM    Hemoglobin 11 7 2023 06:53 AM    Hemoglobin 13 8 2018 10:24 AM      , MCV:   Lab Results   Component Value Date/Time    MCV 93 2023 06:53 AM    MCV 92 2018 10:24 AM      , Platelets:   Lab Results   Component Value Date/Time    Platelets 233  06:53 AM    Platelets 721 15/07/8079 10:24 AM      , 1 hour Glucola: 100    VDRL/RPR: non-reactive    , Hep B: non-reactive    , Hep C: negative   , HIV: negative   , Chlamydia: negative    , Gonorrhea:   Lab Results   Component Value Date/Time    N gonorrhoeae, DNA Probe Negative 2023 02:02 PM     , Group B Strep:    Lab Results   Component Value Date/Time    Strep Grp B PCR Negative 2023 02:02 PM          Invasive Devices     None                   Assessment/Plan     ASSESSMENT:    31 yo  at 39w0d weeks gestation who is being admitted for repeat  section  PLAN:   1) Admit   2) CBC, RPR, Blood Type   3) Anesthesia consult for spinal   4) Ancef 2 gm for ppx   5) D/w Dern    This patient will be an INPATIENT and I certify the anticipated length of stay is >2 Midnights        Ana Paula Roberson MD  2023  7:10 AM

## 2023-05-23 NOTE — PLAN OF CARE
Problem: POSTPARTUM  Goal: Experiences normal postpartum course  Description: INTERVENTIONS:  - Monitor maternal vital signs  - Assess uterine involution and lochia  Outcome: Progressing  Goal: Appropriate maternal -  bonding  Description: INTERVENTIONS:  - Identify family support  - Assess for appropriate maternal/infant bonding   -Encourage maternal/infant bonding opportunities  - Referral to  or  as needed  Outcome: Progressing  Goal: Establishment of infant feeding pattern  Description: INTERVENTIONS:  - Assess breast/bottle feeding  - Refer to lactation as needed  Outcome: Progressing  Goal: Incision(s), wounds(s) or drain site(s) healing without S/S of infection  Description: INTERVENTIONS  - Assess and document dressing, incision, wound bed, drain sites and surrounding tissue  - Provide patient and family education  - Perform skin care/dressing changes  Outcome: Progressing     Problem: PAIN - ADULT  Goal: Verbalizes/displays adequate comfort level or baseline comfort level  Description: Interventions:  - Encourage patient to monitor pain and request assistance  - Assess pain using appropriate pain scale  - Administer analgesics based on type and severity of pain and evaluate response  - Implement non-pharmacological measures as appropriate and evaluate response  - Consider cultural and social influences on pain and pain management  - Notify physician/advanced practitioner if interventions unsuccessful or patient reports new pain  Outcome: Progressing     Problem: INFECTION - ADULT  Goal: Absence or prevention of progression during hospitalization  Description: INTERVENTIONS:  - Assess and monitor for signs and symptoms of infection  - Monitor lab/diagnostic results  - Monitor all insertion sites, i e  indwelling lines, tubes, and drains  - Monitor endotracheal if appropriate and nasal secretions for changes in amount and color  - [place] appropriate cooling/warming therapies per order  - Administer medications as ordered  - Instruct and encourage patient and family to use good hand hygiene technique  - Identify and instruct in appropriate isolation precautions for identified infection/condition  Outcome: Progressing  Goal: Absence of fever/infection during neutropenic period  Description: INTERVENTIONS:  - Monitor WBC    Outcome: Progressing     Problem: Knowledge Deficit  Goal: Patient/family/caregiver demonstrates understanding of disease process, treatment plan, medications, and discharge instructions  Description: Complete learning assessment and assess knowledge base    Interventions:  - Provide teaching at level of understanding  - Provide teaching via preferred learning methods  Outcome: Progressing     Problem: DISCHARGE PLANNING  Goal: Discharge to home or other facility with appropriate resources  Description: INTERVENTIONS:  - Identify barriers to discharge w/patient and caregiver  - Arrange for needed discharge resources and transportation as appropriate  - Identify discharge learning needs (meds, wound care, etc )  - Arrange for interpretive services to assist at discharge as needed  - Refer to Case Management Department for coordinating discharge planning if the patient needs post-hospital services based on physician/advanced practitioner order or complex needs related to functional status, cognitive ability, or social support system  Outcome: Progressing

## 2023-05-23 NOTE — ANESTHESIA POSTPROCEDURE EVALUATION
Post-Op Assessment Note    CV Status:  Stable  Pain Score: 0    Pain management: adequate     Mental Status:  Alert and awake   Hydration Status:  Euvolemic   PONV Controlled:  Controlled   Airway Patency:  Patent      Post Op Vitals Reviewed: Yes      Staff: CRNA         No notable events documented      BP   127/61   Temp 97 8   Pulse  88   Resp   17   SpO2   98%

## 2023-05-24 LAB
ERYTHROCYTE [DISTWIDTH] IN BLOOD BY AUTOMATED COUNT: 14.6 % (ref 11.6–15.1)
HCT VFR BLD AUTO: 31.2 % (ref 34.8–46.1)
HGB BLD-MCNC: 9.8 G/DL (ref 11.5–15.4)
MCH RBC QN AUTO: 29.7 PG (ref 26.8–34.3)
MCHC RBC AUTO-ENTMCNC: 31.4 G/DL (ref 31.4–37.4)
MCV RBC AUTO: 95 FL (ref 82–98)
PLATELET # BLD AUTO: 275 THOUSANDS/UL (ref 149–390)
PMV BLD AUTO: 9.1 FL (ref 8.9–12.7)
RBC # BLD AUTO: 3.3 MILLION/UL (ref 3.81–5.12)
T PALLIDUM AB SER QL IF: NON REACTIVE
WBC # BLD AUTO: 12.84 THOUSAND/UL (ref 4.31–10.16)

## 2023-05-24 RX ADMIN — ACETAMINOPHEN 975 MG: 325 TABLET, FILM COATED ORAL at 09:04

## 2023-05-24 RX ADMIN — DOCUSATE SODIUM 100 MG: 100 CAPSULE, LIQUID FILLED ORAL at 17:54

## 2023-05-24 RX ADMIN — KETOROLAC TROMETHAMINE 15 MG: 30 INJECTION, SOLUTION INTRAMUSCULAR; INTRAVENOUS at 12:18

## 2023-05-24 RX ADMIN — KETOROLAC TROMETHAMINE 15 MG: 30 INJECTION, SOLUTION INTRAMUSCULAR; INTRAVENOUS at 06:16

## 2023-05-24 RX ADMIN — ACETAMINOPHEN 975 MG: 325 TABLET, FILM COATED ORAL at 04:00

## 2023-05-24 RX ADMIN — ACETAMINOPHEN 975 MG: 325 TABLET, FILM COATED ORAL at 21:45

## 2023-05-24 RX ADMIN — VALACYCLOVIR 1000 MG: 500 TABLET, FILM COATED ORAL at 09:05

## 2023-05-24 RX ADMIN — ACETAMINOPHEN 975 MG: 325 TABLET, FILM COATED ORAL at 15:13

## 2023-05-24 RX ADMIN — DOCUSATE SODIUM 100 MG: 100 CAPSULE, LIQUID FILLED ORAL at 09:04

## 2023-05-24 RX ADMIN — IRON SUCROSE 100 MG: 20 INJECTION, SOLUTION INTRAVENOUS at 08:30

## 2023-05-24 RX ADMIN — VALACYCLOVIR 1000 MG: 500 TABLET, FILM COATED ORAL at 21:45

## 2023-05-24 NOTE — PLAN OF CARE
Problem: POSTPARTUM  Goal: Experiences normal postpartum course  Description: INTERVENTIONS:  - Monitor maternal vital signs  - Assess uterine involution and lochia  Outcome: Progressing  Goal: Appropriate maternal -  bonding  Description: INTERVENTIONS:  - Identify family support  - Assess for appropriate maternal/infant bonding   -Encourage maternal/infant bonding opportunities  - Referral to  or  as needed  Outcome: Progressing  Goal: Establishment of infant feeding pattern  Description: INTERVENTIONS:  - Assess breast/bottle feeding  - Refer to lactation as needed  Outcome: Progressing  Goal: Incision(s), wounds(s) or drain site(s) healing without S/S of infection  Description: INTERVENTIONS  - Assess and document dressing, incision, wound bed, drain sites and surrounding tissue  - Provide patient and family education  - Perform skin care/dressing changes  Outcome: Progressing     Problem: PAIN - ADULT  Goal: Verbalizes/displays adequate comfort level or baseline comfort level  Description: Interventions:  - Encourage patient to monitor pain and request assistance  - Assess pain using appropriate pain scale  - Administer analgesics based on type and severity of pain and evaluate response  - Implement non-pharmacological measures as appropriate and evaluate response  - Consider cultural and social influences on pain and pain management  - Notify physician/advanced practitioner if interventions unsuccessful or patient reports new pain  Outcome: Progressing     Problem: INFECTION - ADULT  Goal: Absence or prevention of progression during hospitalization  Description: INTERVENTIONS:  - Assess and monitor for signs and symptoms of infection  - Monitor lab/diagnostic results  - Monitor all insertion sites, i e  indwelling lines, tubes, and drains  - Monitor endotracheal if appropriate and nasal secretions for changes in amount and color  - [place] appropriate cooling/warming therapies per order  - Administer medications as ordered  - Instruct and encourage patient and family to use good hand hygiene technique  - Identify and instruct in appropriate isolation precautions for identified infection/condition  Outcome: Progressing  Goal: Absence of fever/infection during neutropenic period  Description: INTERVENTIONS:  - Monitor WBC    Outcome: Progressing     Problem: Knowledge Deficit  Goal: Patient/family/caregiver demonstrates understanding of disease process, treatment plan, medications, and discharge instructions  Description: Complete learning assessment and assess knowledge base    Interventions:  - Provide teaching at level of understanding  - Provide teaching via preferred learning methods  Outcome: Progressing     Problem: DISCHARGE PLANNING  Goal: Discharge to home or other facility with appropriate resources  Description: INTERVENTIONS:  - Identify barriers to discharge w/patient and caregiver  - Arrange for needed discharge resources and transportation as appropriate  - Identify discharge learning needs (meds, wound care, etc )  - Arrange for interpretive services to assist at discharge as needed  - Refer to Case Management Department for coordinating discharge planning if the patient needs post-hospital services based on physician/advanced practitioner order or complex needs related to functional status, cognitive ability, or social support system  Outcome: Progressing

## 2023-05-24 NOTE — PROGRESS NOTES
Progress Note - OB/GYN  Esteban Ayala 32 y o  female MRN: [id number]  Unit/Bed#:  323-01 Encounter: [id number]    Assessment and Plan     Esteban Ayala is a patient of: [facility]   She is PPD# 1 s/p RLTCS  Recovering well and is stable       * S/P repeat low transverse   Assessment & Plan  , Hgb 11 7 --> post op Hgb 9 8, venofer ordered  Lines: mendez removed, passed void trial  Pain: Tylenol and toradol scheduled, shabnam 5/10 PRN    FEN: Tolerating regular diet  DVT ppx: SCDs   Passing flatus   Incision C/D/I           Disposition    - Anticipate discharge home on POD# 2-4      Subjective/Objective     Chief Complaint: Postpartum State     Subjective: Esteban Ayala is PPD/POD#1 s/pRLTCS  She has no current complaints  Pain is well controlled  Patient is currently voiding  She is ambulating  Patient is currently passing flatus and has had no bowel movement  She is tolerating PO, and denies nausea or vomitting  Patient denies fever, chills, chest pain, shortness of breath, or calf tenderness  Lochia is normal  She is  Breastfeeding  She is recovering well and is stable  Vitals:   /72 (BP Location: Right arm)   Pulse 67   Temp 97 9 °F (36 6 °C) (Oral)   Resp 20   LMP 2022   SpO2 99%   Breastfeeding Yes       Intake/Output Summary (Last 24 hours) at 2023 0601  Last data filed at 2023 0030  Gross per 24 hour   Intake 2100 ml   Output 3115 ml   Net -1015 ml       Invasive Devices     Peripheral Intravenous Line  Duration           Peripheral IV 23 Dorsal (posterior); Left Wrist <1 day                Physical Exam:   GEN: Esteban Ayala appears well, alert and oriented x 3, pleasant and cooperative   CARDIO: RRR, no murmurs or rubs  RESP:  CTAB, no wheezes or rales  ABDOMEN: soft, no tenderness, no distention, fundus @ 2 cm below u   EXTREMITIES: SCDs on, non tender, no erythema      Labs:     ALT   Date Value Ref Range Status   2022 14 12 - 78 U/L Final     Comment:     Specimen collection should occur prior to Sulfasalazine administration due to the potential for falsely depressed results  01/17/2018 12 0 - 32 IU/L Final     AST   Date Value Ref Range Status   05/01/2022 8 5 - 45 U/L Final     Comment:     Specimen collection should occur prior to Sulfasalazine administration due to the potential for falsely depressed results      01/17/2018 14 0 - 40 IU/L Final     Creatinine   Date Value Ref Range Status   05/01/2022 0 76 0 60 - 1 30 mg/dL Final     Comment:     Standardized to IDMS reference method   01/17/2018 0 89 0 57 - 1 00 mg/dL Final     Hemoglobin   Date Value Ref Range Status   05/24/2023 9 8 (L) 11 5 - 15 4 g/dL Final   05/23/2023 11 7 11 5 - 15 4 g/dL Final   01/17/2018 13 8 11 1 - 15 9 g/dL Final     Platelets   Date Value Ref Range Status   05/24/2023 275 149 - 390 Thousands/uL Final   05/23/2023 329 149 - 390 Thousands/uL Final   01/17/2018 285 150 - 379 x10E3/uL Final     WBC   Date Value Ref Range Status   05/24/2023 12 84 (H) 4 31 - 10 16 Thousand/uL Final   05/23/2023 12 76 (H) 4 31 - 10 16 Thousand/uL Final   01/17/2018 9 2 3 4 - 10 8 x10E3/uL Final          Jasmina Knapp MD  5/24/2023  6:01 AM

## 2023-05-24 NOTE — ASSESSMENT & PLAN NOTE
, Hgb 11 7 --> post op Hgb 9 8, s/p venofer  Lines: mendez removed, passed void trial  Pain: Tylenol and toradol scheduled, shabnam 5/10 PRN    FEN: Tolerating regular diet  DVT ppx: SCDs   Passing flatus   Incision C/D/I

## 2023-05-24 NOTE — PLAN OF CARE
Problem: POSTPARTUM  Goal: Experiences normal postpartum course  Description: INTERVENTIONS:  - Monitor maternal vital signs  - Assess uterine involution and lochia  Outcome: Progressing  Goal: Appropriate maternal -  bonding  Description: INTERVENTIONS:  - Identify family support  - Assess for appropriate maternal/infant bonding   -Encourage maternal/infant bonding opportunities  - Referral to  or  as needed  Outcome: Progressing  Goal: Establishment of infant feeding pattern  Description: INTERVENTIONS:  - Assess breast/bottle feeding  - Refer to lactation as needed  Outcome: Progressing  Goal: Incision(s), wounds(s) or drain site(s) healing without S/S of infection  Description: INTERVENTIONS  - Assess and document dressing, incision, wound bed, drain sites and surrounding tissue  - Provide patient and family education  - Perform skin care/dressing changes  Outcome: Progressing     Problem: PAIN - ADULT  Goal: Verbalizes/displays adequate comfort level or baseline comfort level  Description: Interventions:  - Encourage patient to monitor pain and request assistance  - Assess pain using appropriate pain scale  - Administer analgesics based on type and severity of pain and evaluate response  - Implement non-pharmacological measures as appropriate and evaluate response  - Consider cultural and social influences on pain and pain management  - Notify physician/advanced practitioner if interventions unsuccessful or patient reports new pain  Outcome: Progressing     Problem: INFECTION - ADULT  Goal: Absence or prevention of progression during hospitalization  Description: INTERVENTIONS:  - Assess and monitor for signs and symptoms of infection  - Monitor lab/diagnostic results  - Monitor all insertion sites, i e  indwelling lines, tubes, and drains  - Monitor endotracheal if appropriate and nasal secretions for changes in amount and color  - [place] appropriate cooling/warming therapies per order  - Administer medications as ordered  - Instruct and encourage patient and family to use good hand hygiene technique  - Identify and instruct in appropriate isolation precautions for identified infection/condition  Outcome: Progressing  Goal: Absence of fever/infection during neutropenic period  Description: INTERVENTIONS:  - Monitor WBC    Outcome: Progressing     Problem: Knowledge Deficit  Goal: Patient/family/caregiver demonstrates understanding of disease process, treatment plan, medications, and discharge instructions  Description: Complete learning assessment and assess knowledge base    Interventions:  - Provide teaching at level of understanding  - Provide teaching via preferred learning methods  Outcome: Progressing     Problem: DISCHARGE PLANNING  Goal: Discharge to home or other facility with appropriate resources  Description: INTERVENTIONS:  - Identify barriers to discharge w/patient and caregiver  - Arrange for needed discharge resources and transportation as appropriate  - Identify discharge learning needs (meds, wound care, etc )  - Arrange for interpretive services to assist at discharge as needed  - Refer to Case Management Department for coordinating discharge planning if the patient needs post-hospital services based on physician/advanced practitioner order or complex needs related to functional status, cognitive ability, or social support system  Outcome: Progressing

## 2023-05-25 ENCOUNTER — PATIENT MESSAGE (OUTPATIENT)
Dept: OBGYN CLINIC | Facility: CLINIC | Age: 28
End: 2023-05-25

## 2023-05-25 ENCOUNTER — TELEPHONE (OUTPATIENT)
Dept: OBGYN CLINIC | Facility: CLINIC | Age: 28
End: 2023-05-25

## 2023-05-25 VITALS
HEART RATE: 74 BPM | OXYGEN SATURATION: 98 % | RESPIRATION RATE: 20 BRPM | TEMPERATURE: 97.9 F | DIASTOLIC BLOOD PRESSURE: 76 MMHG | SYSTOLIC BLOOD PRESSURE: 127 MMHG

## 2023-05-25 RX ORDER — OXYCODONE HYDROCHLORIDE 10 MG/1
10 TABLET ORAL EVERY 4 HOURS PRN
Status: DISCONTINUED | OUTPATIENT
Start: 2023-05-25 | End: 2023-05-25 | Stop reason: HOSPADM

## 2023-05-25 RX ORDER — OXYCODONE HYDROCHLORIDE 5 MG/1
5 TABLET ORAL EVERY 4 HOURS PRN
Status: DISCONTINUED | OUTPATIENT
Start: 2023-05-25 | End: 2023-05-25 | Stop reason: HOSPADM

## 2023-05-25 RX ORDER — ACETAMINOPHEN 325 MG/1
650 TABLET ORAL EVERY 6 HOURS SCHEDULED
Qty: 30 TABLET | Refills: 0 | Status: CANCELLED | OUTPATIENT
Start: 2023-05-25

## 2023-05-25 RX ORDER — ACETAMINOPHEN 325 MG/1
975 TABLET ORAL EVERY 6 HOURS SCHEDULED
Qty: 12 TABLET | Refills: 0 | Status: SHIPPED | OUTPATIENT
Start: 2023-05-25 | End: 2023-05-26

## 2023-05-25 RX ORDER — IBUPROFEN 600 MG/1
600 TABLET ORAL EVERY 6 HOURS SCHEDULED
Qty: 30 TABLET | Refills: 0 | Status: SHIPPED | OUTPATIENT
Start: 2023-05-25

## 2023-05-25 RX ADMIN — IBUPROFEN 600 MG: 600 TABLET, FILM COATED ORAL at 12:38

## 2023-05-25 RX ADMIN — ACETAMINOPHEN 975 MG: 325 TABLET, FILM COATED ORAL at 04:00

## 2023-05-25 RX ADMIN — IBUPROFEN 600 MG: 600 TABLET, FILM COATED ORAL at 00:30

## 2023-05-25 RX ADMIN — VALACYCLOVIR 1000 MG: 500 TABLET, FILM COATED ORAL at 08:01

## 2023-05-25 RX ADMIN — IBUPROFEN 600 MG: 600 TABLET, FILM COATED ORAL at 06:20

## 2023-05-25 RX ADMIN — DOCUSATE SODIUM 100 MG: 100 CAPSULE, LIQUID FILLED ORAL at 08:01

## 2023-05-25 RX ADMIN — ACETAMINOPHEN 975 MG: 325 TABLET, FILM COATED ORAL at 10:14

## 2023-05-25 NOTE — PLAN OF CARE
Problem: POSTPARTUM  Goal: Experiences normal postpartum course  Description: INTERVENTIONS:  - Monitor maternal vital signs  - Assess uterine involution and lochia  Outcome: Completed  Goal: Appropriate maternal -  bonding  Description: INTERVENTIONS:  - Identify family support  - Assess for appropriate maternal/infant bonding   -Encourage maternal/infant bonding opportunities  - Referral to  or  as needed  Outcome: Completed  Goal: Establishment of infant feeding pattern  Description: INTERVENTIONS:  - Assess breast/bottle feeding  - Refer to lactation as needed  Outcome: Completed  Goal: Incision(s), wounds(s) or drain site(s) healing without S/S of infection  Description: INTERVENTIONS  - Assess and document dressing, incision, wound bed, drain sites and surrounding tissue  - Provide patient and family education  - Perform skin care/dressing changes  Outcome: Completed     Problem: PAIN - ADULT  Goal: Verbalizes/displays adequate comfort level or baseline comfort level  Description: Interventions:  - Encourage patient to monitor pain and request assistance  - Assess pain using appropriate pain scale  - Administer analgesics based on type and severity of pain and evaluate response  - Implement non-pharmacological measures as appropriate and evaluate response  - Consider cultural and social influences on pain and pain management  - Notify physician/advanced practitioner if interventions unsuccessful or patient reports new pain  Outcome: Completed     Problem: INFECTION - ADULT  Goal: Absence or prevention of progression during hospitalization  Description: INTERVENTIONS:  - Assess and monitor for signs and symptoms of infection  - Monitor lab/diagnostic results  - Monitor all insertion sites, i e  indwelling lines, tubes, and drains  - Monitor endotracheal if appropriate and nasal secretions for changes in amount and color  - [place] appropriate cooling/warming therapies per order  - Administer medications as ordered  - Instruct and encourage patient and family to use good hand hygiene technique  - Identify and instruct in appropriate isolation precautions for identified infection/condition  Outcome: Completed  Goal: Absence of fever/infection during neutropenic period  Description: INTERVENTIONS:  - Monitor WBC    Outcome: Completed     Problem: Knowledge Deficit  Goal: Patient/family/caregiver demonstrates understanding of disease process, treatment plan, medications, and discharge instructions  Description: Complete learning assessment and assess knowledge base    Interventions:  - Provide teaching at level of understanding  - Provide teaching via preferred learning methods  Outcome: Completed     Problem: DISCHARGE PLANNING  Goal: Discharge to home or other facility with appropriate resources  Description: INTERVENTIONS:  - Identify barriers to discharge w/patient and caregiver  - Arrange for needed discharge resources and transportation as appropriate  - Identify discharge learning needs (meds, wound care, etc )  - Arrange for interpretive services to assist at discharge as needed  - Refer to Case Management Department for coordinating discharge planning if the patient needs post-hospital services based on physician/advanced practitioner order or complex needs related to functional status, cognitive ability, or social support system  Outcome: Completed

## 2023-05-25 NOTE — TELEPHONE ENCOUNTER
----- Message from Mery Olson RN sent at 5/25/2023  8:05 AM EDT -----  Regarding: FW: Breast Pump Order Info  Contact: [phone]  Do you mind helping with this breast pump    ----- Message -----  From: Sathish Garcia  Sent: 5/25/2023   7:49 AM EDT  To: [address] [place] [address] Clinical  Subject: Breast Pump Order Info                           Jose Deluca,    I spoke with Dr Saini this morning about the breast pump ordered through a company other than [employer]  They seem to need a RX form in order to send the pump out  Dr Saini stated to put a screenshot of the info needed and where to send to  I have attached here! Please let me know if you need anything else! Thank you so much again for everything, delivering our baby girl and keeping me safe  We appreciate all you do!

## 2023-05-25 NOTE — PROGRESS NOTES
Progress Note - OB/GYN  Trino Warren 32 y o  female MRN: [id number]  Unit/Bed#:  323-01 Encounter: [id number]    Assessment and Plan     Trino Warren is a patient of: [facility]   She is PPD#2 s/p RLTCS  Recovering well and is stable       * S/P repeat low transverse   Assessment & Plan  , Hgb 11 7 --> post op Hgb 9 8, s/p venofer  Lines: mendez removed, passed void trial  Pain: Tylenol and toradol scheduled, shabnam 5/10 PRN    FEN: Tolerating regular diet  DVT ppx: SCDs   Passing flatus   Incision C/D/I           Disposition    - Anticipate discharge home today      Subjective/Objective     Chief Complaint: Postpartum State     Subjective: Trino Warren is PPD/POD#2 s/pRLTCS  She has no current complaints  Pain is well controlled  Patient is currently voiding  She is ambulating  Patient is currently passing flatus and has had no bowel movement  She is tolerating PO, and denies nausea or vomitting  Patient denies fever, chills, chest pain, shortness of breath, or calf tenderness  Lochia is normal  She is  Breastfeeding  She is recovering well and is stable  Vitals:   /81 (BP Location: Right arm)   Pulse 83   Temp 98 2 °F (36 8 °C) (Oral)   Resp 18   LMP 2022   SpO2 98%   Breastfeeding Yes     No intake or output data in the 24 hours ending 23 0750    Invasive Devices     Peripheral Intravenous Line  Duration           Peripheral IV 23 Dorsal (posterior); Left Wrist 2 days                Physical Exam:   GEN: Trino Warren appears well, alert and oriented x 3, pleasant and cooperative   CARDIO: RRR, no murmurs or rubs  RESP:  CTAB, no wheezes or rales  ABDOMEN: soft, no tenderness, no distention, fundus @ 2 cm below u   Incision c/d/i  EXTREMITIES: SCDs on, non tender, no erythema      Labs:     ALT   Date Value Ref Range Status   2022 14 12 - 78 U/L Final     Comment:     Specimen collection should occur prior to Sulfasalazine administration due to the potential for falsely depressed results  01/17/2018 12 0 - 32 IU/L Final     AST   Date Value Ref Range Status   05/01/2022 8 5 - 45 U/L Final     Comment:     Specimen collection should occur prior to Sulfasalazine administration due to the potential for falsely depressed results      01/17/2018 14 0 - 40 IU/L Final     Creatinine   Date Value Ref Range Status   05/01/2022 0 76 0 60 - 1 30 mg/dL Final     Comment:     Standardized to IDMS reference method   01/17/2018 0 89 0 57 - 1 00 mg/dL Final     Hemoglobin   Date Value Ref Range Status   05/24/2023 9 8 (L) 11 5 - 15 4 g/dL Final   05/23/2023 11 7 11 5 - 15 4 g/dL Final   01/17/2018 13 8 11 1 - 15 9 g/dL Final     Platelets   Date Value Ref Range Status   05/24/2023 275 149 - 390 Thousands/uL Final   05/23/2023 329 149 - 390 Thousands/uL Final   01/17/2018 285 150 - 379 x10E3/uL Final     WBC   Date Value Ref Range Status   05/24/2023 12 84 (H) 4 31 - 10 16 Thousand/uL Final   05/23/2023 12 76 (H) 4 31 - 10 16 Thousand/uL Final   01/17/2018 9 2 3 4 - 10 8 x10E3/uL Final          Shoaib Crawford MD  5/25/2023  7:50 AM

## 2023-05-25 NOTE — LACTATION NOTE
This note was copied from a baby's chart  Follow up Lactation: mom has baby latched on the right breast in cross cradle hold  Shallow latch noted with space between the cheek and breast  Alignmnet is abnormal due to both cheeks not touching the breast  No pillows or blankets to support mom or baby to the breast     Ed  On posiitoning and alignment  Ed  On taking baby to breast, not breast to baby  Brought baby up to the right breast  U shape hold of the breast  Nipple to nose alignment, wider mouth - deep latch achieved  Active, coordinated sucking  Demonstration and teach-back of breast compressions  Reviewed hand expression prior to latch and between feeds  Reviewed timing of feeds and signs of satiation  Once baby demonstrated an open palm, baby unlatched  Reviewed burping techniques  Brought baby to the left breast in side lying position to assist mom with more latch positions and to offer both breasts at every feeding  Left nipple presents with a compression strip from 12-5 on nipple face  No clinical assessment of the infant oral cavity  Demonstration and teach-back of aligning the nipple to the nose and bringing baby to the breast,not breast to baby  Active, coordinated sucking  Reviewed hand placement and compression onto baby's back  Some active, coordinated sucks  Some compression in front of the nose and under the baby's chin to assist with suckling between long pauses  Education on cluster feeding  Review of feeding log  Wet wound care demonstrated with teach back  Enc  To call lactation for feeding assistance  Handouts provided  Handouts: Mother-led latch,   1st 2 weeks,   Nipple Pain,     Discussed 2nd night syndrome and ways to calm infant  Hand out given  Information on hand expression given   Discussed benefits of knowing how to manually express breast including stimulating milk supply, softening nipple for latch and evacuating breast in the event of engorgement  To help your nipples heal, in addition to paying close attention to latch and positioning, apply protective ointment after feeding or pumping and cover with an occlusive dressing  Education on positioning and alignment  Mom is encouraged to:     - Bring baby up to the breast (use of pillows to elevate so baby's torso is against mom's breasts)   - Skin to skin for feedings with top hand exposed to show signs of satiation   - Chin deep into breast tissue (make baby look up to the nipple)   - nose aligned to the nipple   -Wait for wide gape, drag chin on the breast so nipple is aimed at the upper, back palate  - Cheek should be touching breast   - Deep, firm hold of baby with ear, shoulder, hip alignment    Demonstrated with teach back breast compressions during a feeding to increase milk transfer and stimulate suckling after a breathing/muscle break  Discussed with MOB how to utilize feeding log & monitor baby's output  Education on signs of satiation during a feeding, size of baby's belly, and offering both breasts at every feeding session provided  To help your nipples heal, in addition to paying close attention to latch and positioning, apply protective ointment after feeding or pumping and cover with an occlusive dressing  Encouraged parents to call for assistance, questions, and concerns about breastfeeding  Extension provided

## 2023-05-25 NOTE — PLAN OF CARE
Problem: POSTPARTUM  Goal: Experiences normal postpartum course  Description: INTERVENTIONS:  - Monitor maternal vital signs  - Assess uterine involution and lochia  Outcome: Progressing  Goal: Appropriate maternal -  bonding  Description: INTERVENTIONS:  - Identify family support  - Assess for appropriate maternal/infant bonding   -Encourage maternal/infant bonding opportunities  - Referral to  or  as needed  Outcome: Progressing  Goal: Establishment of infant feeding pattern  Description: INTERVENTIONS:  - Assess breast/bottle feeding  - Refer to lactation as needed  Outcome: Progressing  Goal: Incision(s), wounds(s) or drain site(s) healing without S/S of infection  Description: INTERVENTIONS  - Assess and document dressing, incision, wound bed, drain sites and surrounding tissue  - Provide patient and family education  - Perform skin care/dressing changes  Outcome: Progressing     Problem: PAIN - ADULT  Goal: Verbalizes/displays adequate comfort level or baseline comfort level  Description: Interventions:  - Encourage patient to monitor pain and request assistance  - Assess pain using appropriate pain scale  - Administer analgesics based on type and severity of pain and evaluate response  - Implement non-pharmacological measures as appropriate and evaluate response  - Consider cultural and social influences on pain and pain management  - Notify physician/advanced practitioner if interventions unsuccessful or patient reports new pain  Outcome: Progressing     Problem: INFECTION - ADULT  Goal: Absence or prevention of progression during hospitalization  Description: INTERVENTIONS:  - Assess and monitor for signs and symptoms of infection  - Monitor lab/diagnostic results  - Monitor all insertion sites, i e  indwelling lines, tubes, and drains  - Monitor endotracheal if appropriate and nasal secretions for changes in amount and color  - [place] appropriate cooling/warming therapies per order  - Administer medications as ordered  - Instruct and encourage patient and family to use good hand hygiene technique  - Identify and instruct in appropriate isolation precautions for identified infection/condition  Outcome: Progressing  Goal: Absence of fever/infection during neutropenic period  Description: INTERVENTIONS:  - Monitor WBC    Outcome: Progressing     Problem: Knowledge Deficit  Goal: Patient/family/caregiver demonstrates understanding of disease process, treatment plan, medications, and discharge instructions  Description: Complete learning assessment and assess knowledge base    Interventions:  - Provide teaching at level of understanding  - Provide teaching via preferred learning methods  Outcome: Progressing     Problem: DISCHARGE PLANNING  Goal: Discharge to home or other facility with appropriate resources  Description: INTERVENTIONS:  - Identify barriers to discharge w/patient and caregiver  - Arrange for needed discharge resources and transportation as appropriate  - Identify discharge learning needs (meds, wound care, etc )  - Arrange for interpretive services to assist at discharge as needed  - Refer to Case Management Department for coordinating discharge planning if the patient needs post-hospital services based on physician/advanced practitioner order or complex needs related to functional status, cognitive ability, or social support system  Outcome: Progressing

## 2023-05-29 ENCOUNTER — NURSE TRIAGE (OUTPATIENT)
Dept: OTHER | Facility: OTHER | Age: 28
End: 2023-05-29

## 2023-05-29 ENCOUNTER — APPOINTMENT (EMERGENCY)
Dept: RADIOLOGY | Facility: HOSPITAL | Age: 28
End: 2023-05-29

## 2023-05-29 ENCOUNTER — HOSPITAL ENCOUNTER (EMERGENCY)
Facility: HOSPITAL | Age: 28
Discharge: HOME | End: 2023-05-29
Attending: EMERGENCY MEDICINE

## 2023-05-29 VITALS
HEART RATE: 87 BPM | TEMPERATURE: 98.1 F | DIASTOLIC BLOOD PRESSURE: 81 MMHG | OXYGEN SATURATION: 98 % | RESPIRATION RATE: 20 BRPM | SYSTOLIC BLOOD PRESSURE: 128 MMHG

## 2023-05-29 DIAGNOSIS — R03.0 ELEVATED BLOOD PRESSURE READING: Primary | ICD-10-CM

## 2023-05-29 LAB
ALBUMIN SERPL BCP-MCNC: 3.9 G/DL (ref 3.5–5)
ALP SERPL-CCNC: 115 U/L (ref 34–104)
ALT SERPL W P-5'-P-CCNC: 19 U/L (ref 7–52)
ANION GAP SERPL CALCULATED.3IONS-SCNC: 8 MMOL/L (ref 4–13)
AST SERPL W P-5'-P-CCNC: 14 U/L (ref 13–39)
ATRIAL RATE: 74 BPM
BASOPHILS # BLD AUTO: 0.05 THOUSANDS/ÂΜL (ref 0–0.1)
BASOPHILS NFR BLD AUTO: 1 % (ref 0–1)
BILIRUB SERPL-MCNC: 0.28 MG/DL (ref 0.2–1)
BUN SERPL-MCNC: 12 MG/DL (ref 5–25)
CALCIUM SERPL-MCNC: 9.2 MG/DL (ref 8.4–10.2)
CARDIAC TROPONIN I PNL SERPL HS: 3 NG/L
CHLORIDE SERPL-SCNC: 103 MMOL/L (ref 96–108)
CO2 SERPL-SCNC: 28 MMOL/L (ref 21–32)
CREAT SERPL-MCNC: 0.73 MG/DL (ref 0.6–1.3)
CREAT UR-MCNC: 34.5 MG/DL
EOSINOPHIL # BLD AUTO: 0.35 THOUSAND/ÂΜL (ref 0–0.61)
EOSINOPHIL NFR BLD AUTO: 5 % (ref 0–6)
ERYTHROCYTE [DISTWIDTH] IN BLOOD BY AUTOMATED COUNT: 14.7 % (ref 11.6–15.1)
GFR SERPL CREATININE-BSD FRML MDRD: 111 ML/MIN/1.73SQ M
GLUCOSE SERPL-MCNC: 73 MG/DL (ref 65–140)
HCT VFR BLD AUTO: 39.3 % (ref 34.8–46.1)
HGB BLD-MCNC: 12.5 G/DL (ref 11.5–15.4)
IMM GRANULOCYTES # BLD AUTO: 0.04 THOUSAND/UL (ref 0–0.2)
IMM GRANULOCYTES NFR BLD AUTO: 1 % (ref 0–2)
LDH SERPL-CCNC: 153 U/L (ref 140–271)
LYMPHOCYTES # BLD AUTO: 2.1 THOUSANDS/ÂΜL (ref 0.6–4.47)
LYMPHOCYTES NFR BLD AUTO: 30 % (ref 14–44)
MCH RBC QN AUTO: 30.2 PG (ref 26.8–34.3)
MCHC RBC AUTO-ENTMCNC: 31.8 G/DL (ref 31.4–37.4)
MCV RBC AUTO: 95 FL (ref 82–98)
MONOCYTES # BLD AUTO: 0.4 THOUSAND/ÂΜL (ref 0.17–1.22)
MONOCYTES NFR BLD AUTO: 6 % (ref 4–12)
NEUTROPHILS # BLD AUTO: 4.17 THOUSANDS/ÂΜL (ref 1.85–7.62)
NEUTS SEG NFR BLD AUTO: 57 % (ref 43–75)
NRBC BLD AUTO-RTO: 0 /100 WBCS
P AXIS: 58 DEGREES
PLACENTA IN STORAGE: NORMAL
PLATELET # BLD AUTO: 366 THOUSANDS/UL (ref 149–390)
PMV BLD AUTO: 8.6 FL (ref 8.9–12.7)
POTASSIUM SERPL-SCNC: 3.7 MMOL/L (ref 3.5–5.3)
PR INTERVAL: 122 MS
PROT SERPL-MCNC: 6.6 G/DL (ref 6.4–8.4)
PROT UR-MCNC: <4 MG/DL
QRS AXIS: 72 DEGREES
QRSD INTERVAL: 84 MS
QT INTERVAL: 354 MS
QTC INTERVAL: 392 MS
RBC # BLD AUTO: 4.14 MILLION/UL (ref 3.81–5.12)
SODIUM SERPL-SCNC: 139 MMOL/L (ref 135–147)
T WAVE AXIS: 60 DEGREES
URATE SERPL-MCNC: 4.6 MG/DL (ref 2–7.5)
VENTRICULAR RATE: 74 BPM
WBC # BLD AUTO: 7.11 THOUSAND/UL (ref 4.31–10.16)

## 2023-05-29 NOTE — ED PROVIDER NOTES
History  Chief Complaint   Patient presents with   • Postpartum Complications     Pt had a baby 6 days ago and states she has been monitoring her bp at home and took a bp reading of 145/95 resting  Pt states she had postpartum pre-elampsia with the 1st baby () and was instructed to come here for further eval     Patient is a 32year old female with elevated blood pressure at home since last night and BP was 145/95  Patient has h/o pre-eclampsia  No chest pain or sob  No fever  No N/V  Was last seen at this hospital on 23 for repeat  at 37 weeks  No blurry vision  [facility] website checked on this patient and last Rx filled was on 21 for oxycodone for 3 day supply  History provided by:  Patient and spouse   used: No        Prior to Admission Medications   Prescriptions Last Dose Informant Patient Reported? Taking? Ascorbic Acid (vitamin C) 100 MG tablet  Self Yes No   Sig: Take 100 mg by mouth daily   LYSINE PO  Self Yes No   Sig: Take by mouth   Prenatal Vit-Fe Fumarate-FA (PRENATAL VITAMIN PO)  Self Yes No   Sig: Take by mouth   ibuprofen (MOTRIN) 600 mg tablet   No No   Sig: Take 1 tablet (600 mg total) by mouth every 6 (six) hours   valACYclovir (VALTREX) 500 mg tablet  Self No No   Sig: Take 1 tablet (500 mg total) by mouth 2 (two) times a day      Facility-Administered Medications: None       Past Medical History:   Diagnosis Date   • Anxiety 02/10/2020   • Epidermoid cyst of skin 2017   • Impetigo 2018   • Miscarriage 2020    Last Assessment & Plan:  Treated w Cytotec then d+e,  Advised about pelvic rest for 2 wks Continue pnv , folic acid if she is planning to get pregnant again  Call if any abnormal bleeding or pain     • Preeclampsia, severe 2021   • Sleep disturbances 2018       Past Surgical History:   Procedure Laterality Date   • ADENOIDECTOMY     •  SECTION  2021   •  SECTION     • KNEE ARTHROSCOPY Left • [place]  DELIVERY ONLY N/A 2023    Procedure: Abimael Mathis () REPEAT;  Surgeon: Denisse Dick MD;  Location: [facility] ;  Service: Obstetrics   • TONSILLECTOMY     • WISDOM TOOTH EXTRACTION         Family History   Problem Relation Age of Onset   • Hypertension Mother    • Rashes / Skin problems Father    • Diabetes Maternal Grandmother    • Hypertension Maternal Grandfather    • Breast cancer Paternal Grandmother    • Colon cancer Paternal Grandfather      I have reviewed and agree with the history as documented  E-Cigarette/Vaping   • E-Cigarette Use Never User      E-Cigarette/Vaping Substances   • Nicotine No    • THC No    • CBD No    • Flavoring No    • Other No    • Unknown No      Social History     Tobacco Use   • Smoking status: Never   • Smokeless tobacco: Never   Vaping Use   • Vaping Use: Never used   Substance Use Topics   • Alcohol use: Never   • Drug use: Never       Review of Systems   Constitutional: Negative for fever  Eyes: Negative for visual disturbance  Respiratory: Negative for cough and shortness of breath  Cardiovascular: Negative for chest pain  Gastrointestinal: Negative for nausea and vomiting  All other systems reviewed and are negative  Physical Exam  Physical Exam  Vitals and nursing note reviewed  Constitutional:       General: She is in acute distress (mild)  HENT:      Head: Normocephalic and atraumatic  Mouth/Throat:      Mouth: Mucous membranes are moist    Eyes:      General: No scleral icterus  Extraocular Movements: Extraocular movements intact  Pupils: Pupils are equal, round, and reactive to light  Cardiovascular:      Rate and Rhythm: Normal rate and regular rhythm  Heart sounds: Normal heart sounds  No murmur heard  Pulmonary:      Effort: Pulmonary effort is normal  No respiratory distress  Breath sounds: Normal breath sounds  No stridor  No wheezing, rhonchi or rales     Abdominal:      General: Bowel sounds are normal       Palpations: Abdomen is soft  Tenderness: There is no abdominal tenderness  Musculoskeletal:         General: No deformity  Cervical back: Normal range of motion and neck supple  Right lower leg: No edema  Left lower leg: No edema  Skin:     General: Skin is warm and dry  Findings: No erythema or rash  Neurological:      General: No focal deficit present  Mental Status: She is alert and oriented to person, place, and time  Psychiatric:      Comments: Somewhat anxious            Vital Signs  ED Triage Vitals [05/29/23 0600]   Temperature Pulse Respirations Blood Pressure SpO2   98 1 °F (36 7 °C) 90 20 154/83 100 %      Temp Source Heart Rate Source Patient Position - Orthostatic VS BP Location FiO2 (%)   Oral Monitor Sitting Left arm --      Pain Score       --           Vitals:    05/29/23 0600 05/29/23 0630   BP: 154/83 128/81   Pulse: 90 87   Patient Position - Orthostatic VS: Sitting Sitting         Visual Acuity      ED Medications  Medications - No data to display    Diagnostic Studies  Results Reviewed     Procedure Component Value Units Date/Time    Protein / creatinine ratio, urine [[id number]] Collected: 05/29/23 0643    Lab Status: Final result Specimen: Urine, Clean Catch Updated: 05/29/23 0821     Creatinine, Ur 34 5 mg/dL      Protein Urine Random <4 mg/dL      Prot/Creat Ratio, Ur --    HS Troponin 0hr (reflex protocol) [[id number]]  (Normal) Collected: 05/29/23 0630    Lab Status: Final result Specimen: Blood from Arm, Right Updated: 05/29/23 0710     hs TnI 0hr 3 ng/L     Comprehensive metabolic panel [[id number]]  (Abnormal) Collected: 05/29/23 0630    Lab Status: Final result Specimen: Blood from Arm, Right Updated: 05/29/23 0703     Sodium 139 mmol/L      Potassium 3 7 mmol/L      Chloride 103 mmol/L      CO2 28 mmol/L      ANION GAP 8 mmol/L      BUN 12 mg/dL      Creatinine 0 73 mg/dL      Glucose 73 mg/dL      Calcium 9 2 mg/dL AST 14 U/L      ALT 19 U/L      Alkaline Phosphatase 115 U/L      Total Protein 6 6 g/dL      Albumin 3 9 g/dL      Total Bilirubin 0 28 mg/dL      eGFR 111 ml/min/1 73sq m     Narrative:      [place] guidelines for Chronic Kidney Disease (CKD):   •  Stage 1 with normal or high GFR (GFR > 90 mL/min/1 73 square meters)  •  Stage 2 Mild CKD (GFR = 60-89 mL/min/1 73 square meters)  •  Stage 3A Moderate CKD (GFR = 45-59 mL/min/1 73 square meters)  •  Stage 3B Moderate CKD (GFR = 30-44 mL/min/1 73 square meters)  •  Stage 4 Severe CKD (GFR = 15-29 mL/min/1 73 square meters)  •  Stage 5 End Stage CKD (GFR <15 mL/min/1 73 square meters)  Note: GFR calculation is accurate only with a steady state creatinine    Uric acid [[id number]]  (Normal) Collected: 05/29/23 0630    Lab Status: Final result Specimen: Blood from Arm, Right Updated: 05/29/23 0703     Uric Acid 4 6 mg/dL     Narrative:      N-acetyl-p-benzoquinone imine (metabolite of Acetaminophen) will generate erroneously low results in samples for patients that have taken an overdose of Acetaminophen      LD,Blood [[id number]]  (Normal) Collected: 05/29/23 0630    Lab Status: Final result Specimen: Blood from Arm, Right Updated: 05/29/23 0703      U/L     CBC and differential [[id number]]  (Abnormal) Collected: 05/29/23 0630    Lab Status: Final result Specimen: Blood from Arm, Right Updated: 05/29/23 0640     WBC 7 11 Thousand/uL      RBC 4 14 Million/uL      Hemoglobin 12 5 g/dL      Hematocrit 39 3 %      MCV 95 fL      MCH 30 2 pg      MCHC 31 8 g/dL      RDW 14 7 %      MPV 8 6 fL      Platelets 154 Thousands/uL      nRBC 0 /100 WBCs      Neutrophils Relative 57 %      Immat GRANS % 1 %      Lymphocytes Relative 30 %      Monocytes Relative 6 %      Eosinophils Relative 5 %      Basophils Relative 1 %      Neutrophils Absolute 4 17 Thousands/µL      Immature Grans Absolute 0 04 Thousand/uL      Lymphocytes Absolute 2 10 Thousands/µL Monocytes Absolute 0 40 Thousand/µL      Eosinophils Absolute 0 35 Thousand/µL      Basophils Absolute 0 05 Thousands/µL                  XR chest 1 view portable   ED Interpretation by Shan Fabry, MD (05/29 1994)   No acute disease read by me   Procedures  ECG 12 Lead Documentation Only    Date/Time: 5/29/2023 7:21 AM    Performed by: Shan Fabry, MD  Authorized by: Shan Fabry, MD    Indications / Diagnosis:  Elevated blood pressure  ECG reviewed by me, the ED Provider: yes    Patient location:  ED  Previous ECG:     Previous ECG:  Unavailable  Rate:     ECG rate:  74    ECG rate assessment: normal    Rhythm:     Rhythm: sinus rhythm      Rhythm comment:  S  arrhythmia  Ectopy:     Ectopy: none    QRS:     QRS axis:  Normal    QRS intervals:  Normal  Conduction:     Conduction: normal    ST segments:     ST segments:  Normal  T waves:     T waves: normal               ED Course  ED Course as of 05/29/23 0827   Mon May 29, 2023   0711 Blood Pressure: 128/81  BP improved  3701 Labs, EKG, CXR d/w patient and  with patient's permission  7163 Patient has appointment with Dr Mayela Reece tomorrow  Medical Decision Making  Differential diagnosis including but not limited to: hypertension, hypertensive urgency, hypertensive crisis, malignant hypertension, end organ damage, renal failure, metabolic abnormality, pre-eclampsia; doubt intracranial process, ACS, MI; doubt pheochromocytoma  Doubt post partum cardiomyopathy or CHF  Amount and/or Complexity of Data Reviewed  Labs: ordered  Decision-making details documented in ED Course  Radiology: ordered and independent interpretation performed  Decision-making details documented in ED Course  ECG/medicine tests: ordered and independent interpretation performed  Decision-making details documented in ED Course            Disposition  Final diagnoses:   Elevated blood pressure reading Time reflects when diagnosis was documented in both MDM as applicable and the Disposition within this note     Time User Action Codes Description Comment    5/29/2023  8:26 AM [place] Add [R03 0] Elevated blood pressure reading       ED Disposition     ED Disposition   Discharge    Condition   Stable    Date/Time   Mon May 29, 2023  8:26 AM    Comment   Gerardo Garcia 107 discharge to home/self care  Follow-up Information     Follow up With Specialties Details Why Contact Info    Jairo Tyler DO Family Medicine Call in 1 day Return sooner if increased blood pressure, pain, fever, vomiting, difficulty breathing or urinating, weakness, dizziness, bleeding  [facility]      Shira Anglin MD Obstetrics and Gynecology Go in 1 day  [address]  [phone]            Patient's Medications   Discharge Prescriptions    No medications on file       No discharge procedures on file      PDMP Review       Value Time User    PDMP Reviewed  Yes 5/29/2023  5:58 AM Portillo Gongora MD          ED Provider  Electronically Signed by           Portillo Gongora MD  05/29/23 8026

## 2023-05-29 NOTE — TELEPHONE ENCOUNTER
"Dull headache goes away with tylenol  called in  recent delivery on 23 via csection  dull headache 2-3/10  with high blood pressure  Recent BP was 144/94  denies other symptoms  hx of preclampsia  Advised ED per protocol  pt verbalized understanding   145/95 - an hour ago  144/88- 5 min ago     TC to on call  Agree with recommendations       Reason for Disposition  • Systolic BP  >= 755 OR Diastolic BP >= 90    Answer Assessment - Initial Assessment Questions  1  LOCATION: \"Where does it hurt? \"       Sides of head , temple   2  ONSET: \"When did the headache start? \" (Minutes, hours or days)       Since discharge   3  PATTERN: \"Does the pain come and go, or has it been constant since it started? \"     Comes and goes   4  SEVERITY: \"How bad is the pain? \" and \"What does it keep you from doing? \"     - MILD - doesn't interfere with normal activities     - MODERATE - interferes with normal activities or awakens from sleep     - SEVERE - excruciating pain, unable to do any normal activities        Mild   5  RECURRENT SYMPTOM: \"Have you ever had headaches before? \" If Yes, ask: \"When was the last time? \" and \"What happened that time? \"       Since discharge   6  CAUSE: \"What do you think is causing the headache? \"     Unsure   7  MIGRAINE: \"Have you been diagnosed with migraine headaches? \" If Yes, ask: \"Is this headache similar? \"       Denies   8  PREECLAMPSIA:  \"Were you diagnosed with preeclampsia during your pregnancy? \"        Hx of preeclampsia   9  HEAD INJURY: \"Has there been any recent injury to the head? \"       Denies   10  OTHER SYMPTOMS: \"Do you have any other symptoms? \" (e g , fever, stiff neck, blurred vision; swelling of hands, face, or feet)        Denies   11  DELIVERY DATE: \"When was your delivery date? \" \"Vaginal delivery or ? \" \"Did you have an epidural block during childbirth? \"  23 section    Protocols used: POSTPARTUM - HEADACHE-ADULT-AH      "

## 2023-05-30 ENCOUNTER — VBI (OUTPATIENT)
Dept: ADMINISTRATIVE | Facility: OTHER | Age: 28
End: 2023-05-30

## 2023-05-30 NOTE — TELEPHONE ENCOUNTER
Anyi Galvez    ED Visit Information     Ed visit date: 5/29/2023  Diagnosis Description: Postpartum Complications  In Network? Yes Elia John  Discharge status: Home  Discharged with meds ? No  Number of ED visits to date: 1  ED Severity:3     Outreach Information    Outreach successful: Yes 3  Date letter mailed:N/A  Date Finalized:6/1/2023    Care Coordination    Follow up appointment with specialilty: yes pt had post partum visit w/ OBGYN 5/31/2023   Transportation issues ? No    Value [employer] type: 3 Day Outreach  Emergent necessity warranted by diagnosis: Yes  [facility] PCP: Yes  Transportation: Friend/Family Transport  If able to choose an ED   Would you have chosen [facility]: Yes  Called PCP first?: Yes  Told to go to ED by PCP?: Yes  Same-Day or Next Day Appointment Offered?: No  Would have used same-day or next-day if offered?: Yes  Feels able to call PCP for urgent problems ?: Yes  Understands what emergencies can be handled by PCP ?: Yes  Ever any problems getting appointment with PCP for minor emergency/urgency problems?: No  Practice Contacted Patient ?: Yes  Pt had ED follow up with pcp/staff ?: Yes Call with visit scheduled   Seen for follow-up out of network ?: No  Reason Patient went to ED instead of Urgent Care or PCP?: Perceived Severity of Illness, PCP instructions  Urgent care Education?: Yes  05/30/2023 10:37 AM EDT by Harshad Love 05/30/2023 10:37 AM EDT by Lee Aguilar (Self) [phone] (CXSORM)[phone] (Mobile)  [phone] (Mobile) Remove  - Left MessageCommunicated - [email or web address]    05/31/2023 10:26 AM EDT by Harshad Love 05/31/2023 10:26 AM EDT by Lee Aguilar (Self) [phone] (DEGVQC)[phone] (Mobile)  [phone] (Mobile) Remove  - Left MessageCommunicated - LMOMx2    06/01/2023 09:52 AM EDT by Harshad Love 06/01/2023 09:52 AM EDT by Lee Aguilar (Self) [phone] (Mobile)[phone] (Mobile)  [phone] (Mobile) Remove  - Call CompleteCommunicated - ED outreach successful

## 2023-05-31 ENCOUNTER — POSTPARTUM VISIT (OUTPATIENT)
Dept: OBGYN CLINIC | Facility: CLINIC | Age: 28
End: 2023-05-31

## 2023-05-31 VITALS — SYSTOLIC BLOOD PRESSURE: 122 MMHG | BODY MASS INDEX: 23.86 KG/M2 | DIASTOLIC BLOOD PRESSURE: 80 MMHG | WEIGHT: 143.4 LBS

## 2023-05-31 DIAGNOSIS — Z98.891 S/P REPEAT LOW TRANSVERSE C-SECTION: Primary | ICD-10-CM

## 2023-05-31 NOTE — PROGRESS NOTES
Assessment   Rubina was seen today for postpartum care  Diagnoses and all orders for this visit:    S/P repeat low transverse     Postpartum care and examination of lactating mother         Doing well postoperatively  Plan    1  Continue current medications  2  Wound care discussed  3  Increase activity as tolerated  4  Anticipated return to work: 8 - 12 weeks post partum  5  Follow up at 6 weeks post delivery for postpartum checkup  Isac Bullock is a 32 y o  y o  female who presents approximately one week post discharge following a post  on 23  She is eating a regular diet without difficulty  Bowel movements are normal though she did have constipation initially  She noticed some rectal bleeding following passage of hard stool but that has started to improve  Pain is controlled without any medications  The patient is breastfeeding  The following portions of the patient's history were reviewed and updated as appropriate: allergies, current medications, past family history, past medical history, past social history, past surgical history and problem list     Allergies  Amoxicillin-pot clavulanate    Medications    Current Outpatient Medications:   •  Ascorbic Acid (vitamin C) 100 MG tablet, Take 100 mg by mouth daily, Disp: , Rfl:   •  LYSINE PO, Take by mouth, Disp: , Rfl:   •  Prenatal Vit-Fe Fumarate-FA (PRENATAL VITAMIN PO), Take by mouth, Disp: , Rfl:   •  valACYclovir (VALTREX) 500 mg tablet, Take 1 tablet (500 mg total) by mouth 2 (two) times a day, Disp: 180 tablet, Rfl: 0  •  ibuprofen (MOTRIN) 600 mg tablet, Take 1 tablet (600 mg total) by mouth every 6 (six) hours (Patient not taking: Reported on 2023), Disp: 30 tablet, Rfl: 0      Review of Systems  Denies Fevers/chills/N/V/constipation/ excessive vaginal bleeding/excessive pain/dysuria/frequency of urination  Also as noted in HPI        Objective     /80 (BP Location: Right arm, Patient Position: Sitting, Cuff Size: Standard)   Wt 65 kg (143 lb 6 4 oz)   LMP 08/23/2022   Breastfeeding Yes   BMI 23 86 kg/m²     General: alert and oriented, in no acute distress, appears stated age and cooperative  Abdomen: soft, bowel sounds active, non-tender  Incision: healing well, no drainage, no erythema, no hernia, no seroma, no swelling, no dehiscence, incision well approximated

## 2023-06-29 ENCOUNTER — TELEPHONE (OUTPATIENT)
Dept: OBGYN CLINIC | Facility: CLINIC | Age: 28
End: 2023-06-29

## 2023-06-29 NOTE — TELEPHONE ENCOUNTER
L/M for pt to call if any issues or concerns come up, otherwise we will see the pt at her post partum visit

## 2023-07-03 ENCOUNTER — POSTPARTUM VISIT (OUTPATIENT)
Dept: OBGYN CLINIC | Facility: CLINIC | Age: 28
End: 2023-07-03

## 2023-07-03 VITALS
BODY MASS INDEX: 23.82 KG/M2 | HEIGHT: 65 IN | SYSTOLIC BLOOD PRESSURE: 112 MMHG | DIASTOLIC BLOOD PRESSURE: 78 MMHG | WEIGHT: 143 LBS

## 2023-07-03 PROCEDURE — 99024 POSTOP FOLLOW-UP VISIT: CPT | Performed by: OBSTETRICS & GYNECOLOGY

## 2023-07-03 NOTE — PROGRESS NOTES
Assessment/Plan     Normal postpartum exam.     1. Contraception: IUD. Will plan for mirena or Viveca Burgess; discussed. Reviewed risks/ benefit/ alt. Reviewed risk for implant migration, perforation and possible need for surgical removal.  Discussed AUB and risk for pelvic pain. 2. Annual exam due in October. 3. Lactation consult, [facility] information discussed. 4. Increase activity as tolerated, may resume all normal activity at 6 weeks postpartum. 5. Anticipated return to work: 6 - 12 weeks post partum. Julien Gentile is a 32 y.o. female who presents for a postpartum visit. She is 6 weeks postpartum following a repeat  section, low transverse incision. I have fully reviewed the prenatal and intrapartum course. The delivery was at 44 gestational weeks. Anesthesia: spinal. Laceration: n/a. Bleeding thin lochia. Bowel function is normal. Bladder function is normal. Patient has not been sexually active. Desired contraception method is IUD. Postpartum depression screening: negative. EPDS : 0    Baby's course has been unremarkable.    Baby is feeding by breast.    Last Pap : 10/2022 ; no abnormalities  Gestational Diabetes: no  Pregnancy Complications: none    The following portions of the patient's history were reviewed and updated as appropriate: allergies, past family history, past social history and problem list.      Current Outpatient Medications:   •  Ascorbic Acid (vitamin C) 100 MG tablet, Take 100 mg by mouth daily, Disp: , Rfl:   •  ibuprofen (MOTRIN) 600 mg tablet, Take 1 tablet (600 mg total) by mouth every 6 (six) hours (Patient not taking: Reported on 2023), Disp: 30 tablet, Rfl: 0  •  LYSINE PO, Take by mouth, Disp: , Rfl:   •  Prenatal Vit-Fe Fumarate-FA (PRENATAL VITAMIN PO), Take by mouth, Disp: , Rfl:   •  valACYclovir (VALTREX) 500 mg tablet, Take 1 tablet (500 mg total) by mouth 2 (two) times a day, Disp: 180 tablet, Rfl: 0    Allergies Allergen Reactions   • Amoxicillin-Pot Clavulanate GI Intolerance     Other reaction(s): Unknown Reaction  Other reaction(s): STOMACH UPSET  Reaction when pt was toddler  Other reaction(s): STOMACH UPSET  Reaction when pt was toddler  Other reaction(s): Unknown Reaction  Other reaction(s): STOMACH UPSET  Reaction when pt was toddler       Review of Systems  Constitutional: no fever, feels well  Breasts: no complaints of breast pain, breast lump, or nipple discharge  Gastrointestinal: no complaints nausea, vomiting  Genitourinary: as noted in HPI.   Neurological: no complaints of headache      Objective      /78 (BP Location: Left arm, Patient Position: Standing, Cuff Size: Standard)   Ht 5' 5" (1.651 m)   Wt 64.9 kg (143 lb)   LMP 08/23/2022   Breastfeeding Yes   BMI 23.80 kg/m²     OBGyn Exam  General: alert and oriented, in no acute distress  Abdomen: soft, non-tender, without masses or organomegaly  Incision: clean, dry, and intact

## 2023-07-24 DIAGNOSIS — B00.1 RECURRENT COLD SORES: ICD-10-CM

## 2023-07-25 RX ORDER — VALACYCLOVIR HYDROCHLORIDE 500 MG/1
500 TABLET, FILM COATED ORAL 2 TIMES DAILY
Qty: 180 TABLET | Refills: 1 | Status: SHIPPED | OUTPATIENT
Start: 2023-07-25 | End: 2024-01-21

## 2023-08-11 ENCOUNTER — PROCEDURE VISIT (OUTPATIENT)
Dept: OBGYN CLINIC | Facility: CLINIC | Age: 28
End: 2023-08-11
Payer: COMMERCIAL

## 2023-08-11 VITALS
BODY MASS INDEX: 23.32 KG/M2 | HEIGHT: 65 IN | WEIGHT: 140 LBS | SYSTOLIC BLOOD PRESSURE: 130 MMHG | DIASTOLIC BLOOD PRESSURE: 80 MMHG

## 2023-08-11 DIAGNOSIS — Z30.430 ENCOUNTER FOR IUD INSERTION: Primary | ICD-10-CM

## 2023-08-11 DIAGNOSIS — M62.08 DIASTASIS RECTI: ICD-10-CM

## 2023-08-11 LAB — SL AMB POCT URINE HCG: NEGATIVE

## 2023-08-11 PROCEDURE — 81025 URINE PREGNANCY TEST: CPT | Performed by: PHYSICIAN ASSISTANT

## 2023-08-11 PROCEDURE — 58300 INSERT INTRAUTERINE DEVICE: CPT | Performed by: PHYSICIAN ASSISTANT

## 2023-08-11 NOTE — PROGRESS NOTES
Iud insertions    Date/Time: 8/11/2023 8:30 AM    Performed by: Nunu Cline PA-C  Authorized by: Nunu Cline PA-C    Other Assisting Provider: No    Verbal consent obtained?: Yes    Written consent obtained?: Yes    Risks and benefits: Risks, benefits and alternatives were discussed    Consent given by:  Patient  Patient states understanding of procedure being performed: Yes    Patient's understanding of procedure matches consent: Yes    Procedure consent matches procedure scheduled: Yes    Required items: Required blood products, implants, devices and special equipment available    Patient identity confirmed:  Verbally with patient  Procedure:     Negative serum pregnancy test: yes      Speculum placed in vagina: yes      Tenaculum applied to cervix: yes      Allis applied to cervix: yes      Uterus sounded: yes      Uterus sound depth (cm):  5    IUD inserted with no complications: yes      IUD type:  Mabel [place] (3-4cm)    Strings trimmed: yes    Post-procedure:     Patient tolerated procedure well: yes      Patient will follow up after next period: yes    Comments:      Patient presented today for an IUD insertion for contraception . She is feeling well at this time and has no complaints. The patient was placed in dorsal lithotomy position and the speculum was inserted into the vagina. The cervix was prepped and tenaculum placed on the anterior lip. The uterus was sounded to 5 cm. The IUD was placed without difficulty and the strings trimmed. The tenaculum was removed. Bleeding from tenaculum site was controlled. The patient tolerated the procedure well and without complication. Patient was advised to use warm compresses and take OTC pain relievers for pain control over the next few days. The patient was counseled on normal bleeding and cramping related to IUD insertion. All questions were answered to patient satisfaction.

## 2023-10-04 ENCOUNTER — ANNUAL EXAM (OUTPATIENT)
Dept: OBGYN CLINIC | Facility: CLINIC | Age: 28
End: 2023-10-04
Payer: COMMERCIAL

## 2023-10-04 VITALS
BODY MASS INDEX: 22.66 KG/M2 | DIASTOLIC BLOOD PRESSURE: 80 MMHG | SYSTOLIC BLOOD PRESSURE: 122 MMHG | WEIGHT: 136 LBS | HEIGHT: 65 IN

## 2023-10-04 DIAGNOSIS — B00.1 RECURRENT COLD SORES: ICD-10-CM

## 2023-10-04 DIAGNOSIS — L76.82 PAIN AT SURGICAL INCISION: ICD-10-CM

## 2023-10-04 DIAGNOSIS — Z30.431 IUD CHECK UP: ICD-10-CM

## 2023-10-04 DIAGNOSIS — Z01.419 WOMEN'S ANNUAL ROUTINE GYNECOLOGICAL EXAMINATION: Primary | ICD-10-CM

## 2023-10-04 PROBLEM — Z34.90 CURRENTLY PREGNANT: Status: RESOLVED | Noted: 2023-05-23 | Resolved: 2023-10-04

## 2023-10-04 PROBLEM — O09.299 HISTORY OF PRE-ECLAMPSIA IN PRIOR PREGNANCY, CURRENTLY PREGNANT: Status: RESOLVED | Noted: 2023-04-27 | Resolved: 2023-10-04

## 2023-10-04 PROBLEM — O09.299 HISTORY OF MACROSOMIA IN INFANT IN PRIOR PREGNANCY, CURRENTLY PREGNANT: Status: RESOLVED | Noted: 2023-04-27 | Resolved: 2023-10-04

## 2023-10-04 PROCEDURE — S0612 ANNUAL GYNECOLOGICAL EXAMINA: HCPCS | Performed by: PHYSICIAN ASSISTANT

## 2023-10-04 RX ORDER — LEVONORGESTREL 19.5 MG/1
1 INTRAUTERINE DEVICE INTRAUTERINE ONCE
COMMUNITY

## 2023-10-04 RX ORDER — VALACYCLOVIR HYDROCHLORIDE 500 MG/1
500 TABLET, FILM COATED ORAL 2 TIMES DAILY
Qty: 180 TABLET | Refills: 3 | Status: SHIPPED | OUTPATIENT
Start: 2023-10-04 | End: 2024-04-01

## 2023-10-04 NOTE — PROGRESS NOTES
ASSESSMENT & PLAN:   Diagnoses and all orders for this visit:    Women's annual routine gynecological examination    IUD check up  Comments:  in place on exam    Recurrent cold sores  -     valACYclovir (VALTREX) 500 mg tablet; Take 1 tablet (500 mg total) by mouth 2 (two) times a day    Pain at surgical incision  Comments:  rec warm compresses. Likely nerve related to incision. WIll send to Pelvic PT if no improvement or if sx persist    Other orders  -     levonorgestrel (Kyleena) 19.5 MG intrauterine device; 1 each by Intrauterine route once          The following were reviewed in today's visit: ASCCP guidelines, Gardisil vaccination, STD testing breast self exam, STD testing, exercise and healthy diet. Patient to return to office in yearly for annual exam.     All questions have been answered to her satisfaction. CC:  Annual Gynecologic Examination  Chief Complaint   Patient presents with   • Gynecologic Exam     Patient is here today for her yearly exam, pap up to date(10/17/22-wnl) and an IUD follow up, Annabel Jiménez inserted 8/11/23. Patient is requesting a refill on her Valtrex. She would like to discuss her IUD today. HPI: Fortino Becerril is a 25 y.o. [id number] who presents for annual gynecologic examination. She has the following concerns:  Doing well with kyleena IUD. Patient reports some mild pain on the left side of her incision. It is sometimes noted with movement or when she pushes on it      Health Maintenance:    Exercise: frequently  Breast exams/breast awareness: yes    Past Medical History:   Diagnosis Date   • Abnormal Pap smear of cervix    • Anxiety 02/10/2020   • Epidermoid cyst of skin 04/18/2017   • Herpes    • Impetigo 06/20/2018   • Miscarriage 09/03/2020    Last Assessment & Plan:  Treated w Cytotec then d+e,  Advised about pelvic rest for 2 wks Continue pnv , folic acid if she is planning to get pregnant again. Call if any abnormal bleeding or pain.    • Preeclampsia, severe 2021   • Sleep disturbances 2018       Past Surgical History:   Procedure Laterality Date   • ADENOIDECTOMY     •  SECTION  2021   •  SECTION     • KNEE ARTHROSCOPY Left    • MA  DELIVERY ONLY N/A 2023    Procedure:  SECTION () REPEAT;  Surgeon: Florence Morgan MD;  Location: [facility] ;  Service: Obstetrics   • TONSILLECTOMY     • WISDOM TOOTH EXTRACTION         Past OB/Gyn History:   No LMP recorded. Patient has had an implant. Last Pap:  : no abnormalities  History of abnormal Pap smear: yes  HPV vaccine completed: yes    Patient is currently sexually active.    STD testing: no  Current contraception: IUD      Family History  Family History   Problem Relation Age of Onset   • Hypertension Mother    • Rashes / Skin problems Father    • Diabetes Maternal Grandmother    • Hypertension Maternal Grandfather    • Breast cancer Paternal Grandmother    • Colon cancer Paternal Grandfather    • Cancer Paternal Grandfather        Family history of uterine or ovarian cancer: no  Family history of breast cancer: yes  Family history of colon cancer: yes    Social History:  Social History     Socioeconomic History   • Marital status: /Civil Union     Spouse name: Not on file   • Number of children: Not on file   • Years of education: Not on file   • Highest education level: Not on file   Occupational History   • Not on file   Tobacco Use   • Smoking status: Never   • Smokeless tobacco: Never   Vaping Use   • Vaping Use: Never used   Substance and Sexual Activity   • Alcohol use: Never   • Drug use: Never   • Sexual activity: Yes     Partners: Male     Birth control/protection: None   Other Topics Concern   • Not on file   Social History Narrative    Caffeine use      Social Determinants of Health     Financial Resource Strain: Not on file   Food Insecurity: Not on file   Transportation Needs: Not on file   Physical Activity: Not on file   Stress: Not on file   Social Connections: Not on file   Intimate Partner Violence: Not on file   Housing Stability: Not on file     Domestic violence screen: negative    Allergies: Allergies   Allergen Reactions   • Amoxicillin-Pot Clavulanate GI Intolerance     Other reaction(s): Unknown Reaction  Other reaction(s): STOMACH UPSET  Reaction when pt was toddler  Other reaction(s): STOMACH UPSET  Reaction when pt was toddler  Other reaction(s): Unknown Reaction  Other reaction(s): STOMACH UPSET  Reaction when pt was toddler       Medications:    Current Outpatient Medications:   •  Ascorbic Acid (vitamin C) 100 MG tablet, Take 100 mg by mouth daily, Disp: , Rfl:   •  levonorgestrel (Kyleena) 19.5 MG intrauterine device, 1 each by Intrauterine route once, Disp: , Rfl:   •  LYSINE PO, Take by mouth, Disp: , Rfl:   •  Prenatal Vit-Fe Fumarate-FA (PRENATAL VITAMIN PO), Take by mouth, Disp: , Rfl:   •  valACYclovir (VALTREX) 500 mg tablet, Take 1 tablet (500 mg total) by mouth 2 (two) times a day, Disp: 180 tablet, Rfl: 3    Review of Systems:  Review of Systems   Constitutional: Negative for chills, fever and unexpected weight change. Respiratory: Negative for shortness of breath. Cardiovascular: Negative for chest pain. Gastrointestinal: Negative for abdominal pain, diarrhea, nausea and vomiting. Skin: Negative for rash. Psychiatric/Behavioral: Negative for dysphoric mood. The patient is not nervous/anxious. Physical Exam:  /80 (BP Location: Right arm, Patient Position: Sitting, Cuff Size: Standard)   Ht 5' 5" (1.651 m)   Wt 61.7 kg (136 lb)   Breastfeeding Yes   BMI 22.63 kg/m²    Physical Exam  Constitutional:       Appearance: Normal appearance. Genitourinary:      Vulva and urethral meatus normal.      No lesions in the vagina. Right Labia: No rash or lesions. Left Labia: No lesions or rash. No vaginal discharge, erythema or bleeding.         Right Adnexa: not tender and no mass present. Left Adnexa: not tender and no mass present. No cervical discharge or lesion. IUD strings visualized. Uterus is not tender. Breasts:     Breast exam comments: Deferred due to breastfeeding status. Brad Stubbs HENT:      Head: Normocephalic and atraumatic. Cardiovascular:      Rate and Rhythm: Normal rate and regular rhythm. Heart sounds: Normal heart sounds. No murmur heard. No friction rub. No gallop. Pulmonary:      Effort: Pulmonary effort is normal.      Breath sounds: Normal breath sounds. No wheezing, rhonchi or rales. Abdominal:      General: Abdomen is flat. There is no distension. Palpations: Abdomen is soft. Tenderness: There is no abdominal tenderness. Comments: Incision well healed. Musculoskeletal:      Cervical back: Neck supple. Neurological:      General: No focal deficit present. Mental Status: She is alert. Skin:     General: Skin is warm and dry. Psychiatric:         Mood and Affect: Mood normal.         Behavior: Behavior normal.   Vitals reviewed.

## 2023-10-11 NOTE — ED PROVIDER NOTES
History  Chief Complaint   Patient presents with    Neck Swelling     pt c/o neck chest and back swelling that she noticed in the shower tonight      28 yo female who presents to ED c/o one day of R sided neck swelling which extends inferiorly into the chest wall  No clear precipitant, no trauma, no fever or chills  No arm swelling/pain/numbness/weakness  Not on thinners  No dysphonia or dysarthria, no odynophagia, no difficulty swallowing  No submandibular swelling  Additional history from spouse at bedside  Prior to Admission Medications   Prescriptions Last Dose Informant Patient Reported? Taking?   norethindrone-ethinyl estradiol (JUNEL FE 1/20) 1-20 MG-MCG per tablet   Yes No   Sig: Take 1 tablet by mouth daily   valACYclovir (VALTREX) 500 mg tablet   No No   Sig: Take 1 tablet (500 mg total) by mouth 2 (two) times a day      Facility-Administered Medications: None       Past Medical History:   Diagnosis Date    Anxiety 2/10/2020    Epidermoid cyst of skin 4/18/2017    Headache, tension-type 1/11/2018    Impetigo 6/20/2018    Miscarriage 9/3/2020    Last Assessment & Plan:  Treated w Cytotec then d+e,  Advised about pelvic rest for 2 wks Continue pnv , folic acid if she is planning to get pregnant again  Call if any abnormal bleeding or pain   Preeclampsia, severe 8/29/2021    Sleep disturbances 1/11/2018       Past Surgical History:   Procedure Laterality Date    ADENOIDECTOMY      KNEE ARTHROSCOPY      TONSILLECTOMY      WISDOM TOOTH EXTRACTION         Family History   Problem Relation Age of Onset    No Known Problems Mother     No Known Problems Father      I have reviewed and agree with the history as documented      E-Cigarette/Vaping     E-Cigarette/Vaping Substances     Social History     Tobacco Use    Smoking status: Never Smoker    Smokeless tobacco: Never Used   Substance Use Topics    Alcohol use: Never    Drug use: Not on file       Review of Systems   Constitutional: PHYSICAL / OCCUPATIONAL THERAPY - DAILY TREATMENT NOTE (updated )    Patient Name: Marty Hunter    Date: 10/11/2023    : 2003  Insurance: Payor: Gabby Mejia / Plan: [employer] / Product Type: *No Product type* /      Patient  verified yes     Visit #   Current / Total 19 19-27   Time   In / Out 11:35 12:04   Pain   In / Out 4 4   Subjective Functional Status/Changes: Pt reports her lower back (indicates SI) has still been bother her, states when she saw Dr. Ankit Brady at her most recent appointment she had told him about her back pain and he cracked her back, it felt better for a little while, but the pain came back. Pt states pain is mostly with sitting. Pt reports working on her exercises at home, still not getting 10 reps of the eye movements consistently, mainly feels eye strain with it. Pt reports she does feel like she is improving slowly with her balance and dizziness, will still loose her balance sometimes. Pt reports neck pain with quick movements of her head. Next PN due 10/26/23  RC NA  Auth date 2023 (15 visits)     TREATMENT AREA =  Unsteadiness on feet [R26.81]  Concussion [S06. 0XAA]    OBJECTIVE    Therapeutic Procedures: Tx Min Billable or 1:1 Min (if diff from Tx Min) Procedure, Rationale, Specifics   15  43870 Therapeutic Exercise (timed):  increase ROM, strength, coordination, balance, and proprioception to improve patient's ability to progress to PLOF and address remaining functional goals. (see flow sheet as applicable)     Details if applicable:       14  61547 Therapeutic Activity (timed):  use of dynamic activities replicating functional movements to increase ROM, strength, coordination, balance, and proprioception in order to improve patient's ability to progress to PLOF and address remaining functional goals.   (see flow sheet as applicable)     Details if applicable:     34  MC BC Totals Reminder: bill using total Negative for chills, fever and unexpected weight change  Respiratory: Negative for chest tightness and shortness of breath  Cardiovascular: Negative for chest pain, palpitations and leg swelling  Gastrointestinal: Negative for abdominal distention and abdominal pain  All other systems reviewed and are negative  Physical Exam  Physical Exam  Vitals and nursing note reviewed  Constitutional:       General: She is not in acute distress  Appearance: She is well-developed  She is not diaphoretic  HENT:      Head: Normocephalic and atraumatic  Eyes:      Conjunctiva/sclera: Conjunctivae normal       Pupils: Pupils are equal, round, and reactive to light  Neck:      Vascular: No JVD  Cardiovascular:      Rate and Rhythm: Normal rate and regular rhythm  Heart sounds: Normal heart sounds  Pulmonary:      Effort: Pulmonary effort is normal       Breath sounds: Normal breath sounds  No stridor  Comments: There is mild soft tissue swelling over the anterior triangle of the neck on the R side without fluctuance, erythema, warmth or crepitus  The swelling extends inferior over the R anterior chest wall  There is no cervical artery bruit  There is no tenderness of the neck or chest wall, nor is there ecchymosis or any rash or discoloration  Chest:      Chest wall: No tenderness  Abdominal:      General: There is no distension  Palpations: Abdomen is soft  Tenderness: There is no abdominal tenderness  There is no guarding or rebound  Musculoskeletal:         General: No tenderness or deformity  Normal range of motion  Cervical back: Normal range of motion and neck supple  Skin:     General: Skin is warm and dry  Capillary Refill: Capillary refill takes less than 2 seconds  Coloration: Skin is not jaundiced or pale  Findings: No bruising, erythema, lesion or rash  Neurological:      General: No focal deficit present        Mental Status: She is alert and oriented to person, place, and time  Cranial Nerves: No cranial nerve deficit  Sensory: No sensory deficit  Motor: No weakness or abnormal muscle tone  Coordination: Coordination normal       Gait: Gait normal          Vital Signs  ED Triage Vitals [05/01/22 1947]   Temperature Pulse Respirations Blood Pressure SpO2   98 4 °F (36 9 °C) 98 17 147/82 100 %      Temp Source Heart Rate Source Patient Position - Orthostatic VS BP Location FiO2 (%)   Oral Monitor Sitting Right arm --      Pain Score       --           Vitals:    05/01/22 1947 05/01/22 2204   BP: 147/82 126/80   Pulse: 98 87   Patient Position - Orthostatic VS: Sitting Sitting         Visual Acuity      ED Medications  Medications   iohexol (OMNIPAQUE) 350 MG/ML injection (SINGLE-DOSE) 100 mL (100 mL Intravenous Given 5/1/22 2051)       Diagnostic Studies  Results Reviewed     Procedure Component Value Units Date/Time    Hepatic function panel [[id number]]  (Normal) Collected: 05/01/22 2006    Lab Status: Final result Specimen: Blood from Arm, Left Updated: 05/01/22 2034     Total Bilirubin 0 54 mg/dL      Bilirubin, Direct 0 16 mg/dL      Alkaline Phosphatase 68 U/L      AST 8 U/L      ALT 14 U/L      Total Protein 6 4 g/dL      Albumin 3 5 g/dL     TSH [[id number]]  (Normal) Collected: 05/01/22 2006    Lab Status: Final result Specimen: Blood from Arm, Left Updated: 05/01/22 2034     TSH 3RD GENERATON 3 071 uIU/mL     Narrative:      Patients undergoing fluorescein dye angiography may retain small amounts of fluorescein in the body for 48-72 hours post procedure  Samples containing fluorescein can produce falsely depressed TSH values  If the patient had this procedure,a specimen should be resubmitted post fluorescein clearance        Basic metabolic panel [[id number]]  (Abnormal) Collected: 05/01/22 2006    Lab Status: Final result Specimen: Blood from Arm, Left Updated: 05/01/22 2025     Sodium 139 mmol/L      Potassium 3 3 mmol/L Chloride 105 mmol/L      CO2 26 mmol/L      ANION GAP 8 mmol/L      BUN 9 mg/dL      Creatinine 0 76 mg/dL      Glucose 100 mg/dL      Calcium 8 3 mg/dL      eGFR 107 ml/min/1 73sq m     Narrative:      [place] guidelines for Chronic Kidney Disease (CKD):     Stage 1 with normal or high GFR (GFR > 90 mL/min/1 73 square meters)    Stage 2 Mild CKD (GFR = 60-89 mL/min/1 73 square meters)    Stage 3A Moderate CKD (GFR = 45-59 mL/min/1 73 square meters)    Stage 3B Moderate CKD (GFR = 30-44 mL/min/1 73 square meters)    Stage 4 Severe CKD (GFR = 15-29 mL/min/1 73 square meters)    Stage 5 End Stage CKD (GFR <15 mL/min/1 73 square meters)  Note: GFR calculation is accurate only with a steady state creatinine    Protime-INR [[id number]]  (Normal) Collected: 05/01/22 2006    Lab Status: Final result Specimen: Blood from Arm, Left Updated: 05/01/22 2020     Protime 13 4 seconds      INR 1 06    APTT [[id number]]  (Normal) Collected: 05/01/22 2006    Lab Status: Final result Specimen: Blood from Arm, Left Updated: 05/01/22 2020     PTT 29 seconds     CBC and differential [[id number]] Collected: 05/01/22 2006    Lab Status: Final result Specimen: Blood from Arm, Left Updated: 05/01/22 2011     WBC 7 74 Thousand/uL      RBC 4 67 Million/uL      Hemoglobin 14 9 g/dL      Hematocrit 43 8 %      MCV 94 fL      MCH 31 9 pg      MCHC 34 0 g/dL      RDW 12 6 %      MPV 8 9 fL      Platelets 407 Thousands/uL      nRBC 0 /100 WBCs      Neutrophils Relative 59 %      Immat GRANS % 0 %      Lymphocytes Relative 28 %      Monocytes Relative 7 %      Eosinophils Relative 5 %      Basophils Relative 1 %      Neutrophils Absolute 4 54 Thousands/µL      Immature Grans Absolute 0 01 Thousand/uL      Lymphocytes Absolute 2 20 Thousands/µL      Monocytes Absolute 0 52 Thousand/µL      Eosinophils Absolute 0 40 Thousand/µL      Basophils Absolute 0 07 Thousands/µL                  CT soft tissue neck with contrast Final Result by Callie Carrasquillo MD (05/01 2207)      Right neck deep space soft tissue inflammation/infection without fluid collection  Workstation performed: [id number]                    Procedures  Procedures         ED Course                               SBIRT 20yo+      Most Recent Value   SBIRT (25 yo +)    In order to provide better care to our patients, we are screening all of our patients for alcohol and drug use  Would it be okay to ask you these screening questions? Yes Filed at: 05/01/2022 2008   Initial Alcohol Screen: US AUDIT-C     1  How often do you have a drink containing alcohol? 0 Filed at: 05/01/2022 2008   2  How many drinks containing alcohol do you have on a typical day you are drinking? 0 Filed at: 05/01/2022 2008   3b  FEMALE Any Age, or MALE 65+: How often do you have 4 or more drinks on one occassion? 0 Filed at: 05/01/2022 2008   Audit-C Score 0 Filed at: 05/01/2022 2008   KEE: How many times in the past year have you    Used an illegal drug or used a prescription medication for non-medical reasons? Never Filed at: 05/01/2022 2008                    [facility]  Number of Diagnoses or Management Options     Amount and/or Complexity of Data Reviewed  Clinical lab tests: reviewed and ordered  Tests in the radiology section of CPT®: ordered and reviewed  Independent visualization of images, tracings, or specimens: yes        Disposition  Final diagnoses:   Neck swelling     Time reflects when diagnosis was documented in both MDM as applicable and the Disposition within this note     Time User Action Codes Description Comment    5/1/2022 10:29 PM Zayda Hurley Add [R22 1] Neck swelling       ED Disposition     ED Disposition Condition Date/Time Comment    Discharge Stable Sun May 1, 2022 10:29 PM Henry Grace discharge to home/self care              Follow-up Information     Follow up With Specialties Details Why Contact Info Additional [address] [facility] Emergency Department Emergency Medicine  If symptoms worsen [address] [facility] [address] [facility] Emergency Department, [address] [place] [address], [place], [place], [address]          Discharge Medication List as of 5/1/2022 10:30 PM      START taking these medications    Details   clindamycin (CLEOCIN) 150 mg capsule Take 2 capsules (300 mg total) by mouth every 8 (eight) hours for 7 days, Starting Sun 5/1/2022, Until Sun 5/8/2022, Print         CONTINUE these medications which have NOT CHANGED    Details   norethindrone-ethinyl estradiol (JUNEL FE 1/20) 1-20 MG-MCG per tablet Take 1 tablet by mouth daily, Starting Tue 11/23/2021, Until Wed 11/23/2022, Historical Med      valACYclovir (VALTREX) 500 mg tablet Take 1 tablet (500 mg total) by mouth 2 (two) times a day, Starting Tue 3/1/2022, Until Mon 5/30/2022, Normal             No discharge procedures on file      PDMP Review     None          ED Provider  Electronically Signed by           Arik Murray MD  05/10/22 2756

## 2023-11-08 ENCOUNTER — RA CDI HCC (OUTPATIENT)
Dept: OTHER | Facility: HOSPITAL | Age: 28
End: 2023-11-08

## 2023-11-20 NOTE — PROGRESS NOTES
Nitin Ross 1995 female MRN: [id number]      ASSESSMENT/PLAN  Problem List Items Addressed This Visit    None  Visit Diagnoses     Healthcare maintenance    -  Primary    Screening for diabetes mellitus        Relevant Orders    Comprehensive metabolic panel    Screening, lipid        Relevant Orders    Lipid panel        BP WNL   CMP + Lipids to screen for HLD, DM   Pap UTD -- will link to records   Vaccinations: TDap UTD, Flu UTD, COVID primary + boosters   Encouraged regular physical activity, varied diet, and regular dental/eye exams     Future Appointments   Date Time Provider [address]   1/23/2024  3:50 PM Corbin Barron MD DERM [facility]   10/7/2024  8:30 AM Codi Mitchell PA-C [facility]          SUBJECTIVE  CC: Physical Exam      HPI:  Nitin Ross is a 25 y.o. female who presents with her daughter for annual physical. History reviewed and updated as below. No acute concerns. 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 HENT:  Negative for congestion, ear pain, rhinorrhea and sore throat. Eyes:  Negative for visual disturbance. Respiratory:  Negative for cough and shortness of breath. Cardiovascular:  Negative for chest pain, palpitations and leg swelling. Gastrointestinal:  Negative for abdominal pain, constipation and diarrhea. Endocrine: Negative for polyuria. Genitourinary:  Negative for dysuria and menstrual problem (has had spotting the past few days). Neurological:  Negative for dizziness and headaches. Psychiatric/Behavioral:  Negative for sleep disturbance.         Historical Information   The patient history was reviewed and updated as follows:    Past Medical History:   Diagnosis Date   • Abnormal Pap smear of cervix    • Anxiety 02/10/2020   • Epidermoid cyst of skin 04/18/2017   • Herpes    • Impetigo 2018   • Miscarriage 2020    Last Assessment & Plan:  Treated w Cytotec then d+e,  Advised about pelvic rest for 2 wks Continue pnv , folic acid if she is planning to get pregnant again. Call if any abnormal bleeding or pain.    • Preeclampsia, severe 2021   • S/P repeat low transverse  2023   • Sleep disturbances 2018     Past Surgical History:   Procedure Laterality Date   • ADENOIDECTOMY     •  SECTION  2021   •  SECTION     • KNEE ARTHROSCOPY Left    • LA  DELIVERY ONLY N/A 2023    Procedure:  SECTION () REPEAT;  Surgeon: Marcial Vazquez MD;  Location: [facility] ;  Service: Obstetrics   • TONSILLECTOMY     • WISDOM TOOTH EXTRACTION       Family History   Problem Relation Age of Onset   • Hypertension Mother    • Rashes / Skin problems Father    • Diabetes Maternal Grandmother    • Hypertension Maternal Grandfather    • Breast cancer Paternal Grandmother    • Colon cancer Paternal Grandfather    • Cancer Paternal Grandfather       Social History   Social History     Substance and Sexual Activity   Alcohol Use Never     Social History     Substance and Sexual Activity   Drug Use Never     Social History     Tobacco Use   Smoking Status Never   Smokeless Tobacco Never       Medications:     Current Outpatient Medications:   •  Ascorbic Acid (vitamin C) 100 MG tablet, Take 100 mg by mouth daily, Disp: , Rfl:   •  levonorgestrel (Kyleena) 19.5 MG intrauterine device, 1 each by Intrauterine route once, Disp: , Rfl:   •  LYSINE PO, Take by mouth, Disp: , Rfl:   •  Prenatal Vit-Fe Fumarate-FA (PRENATAL VITAMIN PO), Take by mouth, Disp: , Rfl:   •  valACYclovir (VALTREX) 500 mg tablet, Take 1 tablet (500 mg total) by mouth 2 (two) times a day, Disp: 180 tablet, Rfl: 3  Allergies   Allergen Reactions   • Amoxicillin-Pot Clavulanate GI Intolerance     Other reaction(s): Unknown Reaction  Other reaction(s): STOMACH UPSET  Reaction when pt was toddler  Other reaction(s): STOMACH UPSET  Reaction when pt was toddler  Other reaction(s): Unknown Reaction  Other reaction(s): STOMACH UPSET  Reaction when pt was toddler       OBJECTIVE    Vitals:   Vitals:    11/21/23 0811   BP: 134/70   Pulse: 92   Temp: (!) 97.2 °F (36.2 °C)   SpO2: 99%   Weight: 61.7 kg (136 lb)   Height: 5' 5" (1.651 m)           Physical Exam  Vitals and nursing note reviewed. Constitutional:       General: She is not in acute distress. Appearance: Normal appearance. HENT:      Head: Normocephalic and atraumatic. Right Ear: Tympanic membrane, ear canal and external ear normal.      Left Ear: Tympanic membrane, ear canal and external ear normal.      Nose: Nose normal.      Mouth/Throat:      Mouth: Mucous membranes are moist.      Pharynx: No oropharyngeal exudate or posterior oropharyngeal erythema. Eyes:      Conjunctiva/sclera: Conjunctivae normal.   Cardiovascular:      Rate and Rhythm: Normal rate and regular rhythm. Pulmonary:      Effort: Pulmonary effort is normal. No respiratory distress. Breath sounds: Normal breath sounds. Abdominal:      General: Bowel sounds are normal. There is no distension. Palpations: Abdomen is soft. Tenderness: There is no abdominal tenderness. Musculoskeletal:      Right lower leg: No edema. Left lower leg: No edema. Lymphadenopathy:      Cervical: No cervical adenopathy. Skin:     General: Skin is warm and dry. Neurological:      Mental Status: She is alert.       Comments: Grossly intact   Psychiatric:         Mood and Affect: Mood normal.                    Selina Calles DO  [facility]'s [address] [facility]   11/21/2023  8:28 AM

## 2023-11-21 ENCOUNTER — OFFICE VISIT (OUTPATIENT)
Dept: FAMILY MEDICINE CLINIC | Facility: CLINIC | Age: 28
End: 2023-11-21
Payer: COMMERCIAL

## 2023-11-21 VITALS
HEART RATE: 92 BPM | BODY MASS INDEX: 22.66 KG/M2 | HEIGHT: 65 IN | SYSTOLIC BLOOD PRESSURE: 134 MMHG | TEMPERATURE: 97.2 F | DIASTOLIC BLOOD PRESSURE: 70 MMHG | WEIGHT: 136 LBS | OXYGEN SATURATION: 99 %

## 2023-11-21 DIAGNOSIS — Z00.00 HEALTHCARE MAINTENANCE: Primary | ICD-10-CM

## 2023-11-21 DIAGNOSIS — Z13.1 SCREENING FOR DIABETES MELLITUS: ICD-10-CM

## 2023-11-21 DIAGNOSIS — Z13.220 SCREENING, LIPID: ICD-10-CM

## 2023-11-21 PROBLEM — Z98.891 S/P REPEAT LOW TRANSVERSE C-SECTION: Status: RESOLVED | Noted: 2023-04-27 | Resolved: 2023-11-21

## 2023-11-21 PROCEDURE — 99395 PREV VISIT EST AGE 18-39: CPT | Performed by: FAMILY MEDICINE

## 2023-11-22 ENCOUNTER — TELEPHONE (OUTPATIENT)
Dept: ADMINISTRATIVE | Facility: OTHER | Age: 28
End: 2023-11-22

## 2023-11-22 NOTE — TELEPHONE ENCOUNTER
Upon review of the In Basket request we were able to locate, review, and update the patient chart as requested for Hepatitis C  and Pap Smear (HPV) aka Cervical Cancer Screening. Any additional questions or concerns should be emailed to the Practice Liaisons via the appropriate education email address, please do not reply via In Basket.     Thank you  Lor Aguilera MA

## 2023-11-22 NOTE — TELEPHONE ENCOUNTER
----- Message from Edwin Lira MA sent at 11/21/2023  8:15 AM EST -----  Regarding: [employer] Request  11/21/23 8:15 AM    [facility], our patient Michelle Duvall has had Pap Smear (HPV) aka Cervical Cancer Screening completed/performed. Please assist in updating the patient chart by pulling the Care Everywhere (CE) document. The date of service is 10/2022. Thank you,  Edwin Lira PG [address] [place] [address], [address] [place] [address]  11/21/23 8:16 AM    Hello, our patient Michelle Duvall has had Hepatitis C completed/performed. Please assist in updating the patient chart by pulling the Care Everywhere (CE) document. The date of service is 2018.     Thank you,  Edwin BROWN

## 2023-11-29 ENCOUNTER — APPOINTMENT (OUTPATIENT)
Dept: LAB | Facility: CLINIC | Age: 28
End: 2023-11-29
Payer: COMMERCIAL

## 2023-11-29 DIAGNOSIS — Z13.1 SCREENING FOR DIABETES MELLITUS: ICD-10-CM

## 2023-11-29 DIAGNOSIS — Z13.220 SCREENING, LIPID: ICD-10-CM

## 2023-11-29 LAB
ALBUMIN SERPL BCP-MCNC: 4.6 G/DL (ref 3.5–5)
ALP SERPL-CCNC: 68 U/L (ref 34–104)
ALT SERPL W P-5'-P-CCNC: 12 U/L (ref 7–52)
ANION GAP SERPL CALCULATED.3IONS-SCNC: 9 MMOL/L
AST SERPL W P-5'-P-CCNC: 12 U/L (ref 13–39)
BILIRUB SERPL-MCNC: 0.76 MG/DL (ref 0.2–1)
BUN SERPL-MCNC: 16 MG/DL (ref 5–25)
CALCIUM SERPL-MCNC: 8.8 MG/DL (ref 8.4–10.2)
CHLORIDE SERPL-SCNC: 105 MMOL/L (ref 96–108)
CHOLEST SERPL-MCNC: 171 MG/DL
CO2 SERPL-SCNC: 26 MMOL/L (ref 21–32)
CREAT SERPL-MCNC: 0.86 MG/DL (ref 0.6–1.3)
GFR SERPL CREATININE-BSD FRML MDRD: 90 ML/MIN/1.73SQ M
GLUCOSE P FAST SERPL-MCNC: 73 MG/DL (ref 65–99)
HDLC SERPL-MCNC: 96 MG/DL
LDLC SERPL CALC-MCNC: 69 MG/DL (ref 0–100)
NONHDLC SERPL-MCNC: 75 MG/DL
POTASSIUM SERPL-SCNC: 4.2 MMOL/L (ref 3.5–5.3)
PROT SERPL-MCNC: 6.8 G/DL (ref 6.4–8.4)
SODIUM SERPL-SCNC: 140 MMOL/L (ref 135–147)
TRIGL SERPL-MCNC: 31 MG/DL

## 2023-11-29 PROCEDURE — 80053 COMPREHEN METABOLIC PANEL: CPT

## 2023-11-29 PROCEDURE — 80061 LIPID PANEL: CPT

## 2023-11-29 PROCEDURE — 36415 COLL VENOUS BLD VENIPUNCTURE: CPT

## 2023-11-30 DIAGNOSIS — R94.4 DECREASED GFR: Primary | ICD-10-CM

## 2023-12-22 ENCOUNTER — APPOINTMENT (OUTPATIENT)
Dept: LAB | Facility: CLINIC | Age: 28
End: 2023-12-22
Payer: COMMERCIAL

## 2023-12-22 DIAGNOSIS — R94.4 DECREASED GFR: ICD-10-CM

## 2023-12-22 LAB
ANION GAP SERPL CALCULATED.3IONS-SCNC: 10 MMOL/L
BUN SERPL-MCNC: 13 MG/DL (ref 5–25)
CALCIUM SERPL-MCNC: 10 MG/DL (ref 8.4–10.2)
CHLORIDE SERPL-SCNC: 104 MMOL/L (ref 96–108)
CO2 SERPL-SCNC: 25 MMOL/L (ref 21–32)
CREAT SERPL-MCNC: 0.82 MG/DL (ref 0.6–1.3)
GFR SERPL CREATININE-BSD FRML MDRD: 96 ML/MIN/1.73SQ M
GLUCOSE P FAST SERPL-MCNC: 74 MG/DL (ref 65–99)
POTASSIUM SERPL-SCNC: 4.2 MMOL/L (ref 3.5–5.3)
SODIUM SERPL-SCNC: 139 MMOL/L (ref 135–147)

## 2023-12-22 PROCEDURE — 80048 BASIC METABOLIC PNL TOTAL CA: CPT

## 2023-12-22 PROCEDURE — 36415 COLL VENOUS BLD VENIPUNCTURE: CPT

## 2024-01-25 ENCOUNTER — HOSPITAL ENCOUNTER (EMERGENCY)
Facility: HOSPITAL | Age: 29
Discharge: HOME | End: 2024-01-25
Attending: EMERGENCY MEDICINE
Payer: COMMERCIAL

## 2024-01-25 VITALS
HEIGHT: 65 IN | RESPIRATION RATE: 18 BRPM | TEMPERATURE: 98.4 F | BODY MASS INDEX: 21.16 KG/M2 | SYSTOLIC BLOOD PRESSURE: 117 MMHG | DIASTOLIC BLOOD PRESSURE: 66 MMHG | OXYGEN SATURATION: 97 % | HEART RATE: 94 BPM | WEIGHT: 127 LBS

## 2024-01-25 DIAGNOSIS — R11.2 NAUSEA AND VOMITING: ICD-10-CM

## 2024-01-25 DIAGNOSIS — G47.00 INSOMNIA: Primary | ICD-10-CM

## 2024-01-25 DIAGNOSIS — F41.9 ANXIETY: ICD-10-CM

## 2024-01-25 DIAGNOSIS — R14.0 ABDOMINAL BLOATING: ICD-10-CM

## 2024-01-25 LAB
ALBUMIN SERPL BCP-MCNC: 4.4 G/DL (ref 3.5–5)
ALP SERPL-CCNC: 72 U/L (ref 34–104)
ALT SERPL W P-5'-P-CCNC: 13 U/L (ref 7–52)
AMPHETAMINES SERPL QL SCN: NEGATIVE
ANION GAP SERPL CALCULATED.3IONS-SCNC: 7 MMOL/L
AST SERPL W P-5'-P-CCNC: 14 U/L (ref 13–39)
BARBITURATES UR QL: NEGATIVE
BASOPHILS # BLD AUTO: 0.03 THOUSANDS/ÂΜL (ref 0–0.1)
BASOPHILS NFR BLD AUTO: 1 % (ref 0–1)
BENZODIAZ UR QL: NEGATIVE
BILIRUB SERPL-MCNC: 0.42 MG/DL (ref 0.2–1)
BUN SERPL-MCNC: 13 MG/DL (ref 5–25)
CALCIUM SERPL-MCNC: 9 MG/DL (ref 8.4–10.2)
CHLORIDE SERPL-SCNC: 109 MMOL/L (ref 96–108)
CO2 SERPL-SCNC: 24 MMOL/L (ref 21–32)
COCAINE UR QL: NEGATIVE
CREAT SERPL-MCNC: 0.61 MG/DL (ref 0.6–1.3)
EOSINOPHIL # BLD AUTO: 0.04 THOUSAND/ÂΜL (ref 0–0.61)
EOSINOPHIL NFR BLD AUTO: 1 % (ref 0–6)
ERYTHROCYTE [DISTWIDTH] IN BLOOD BY AUTOMATED COUNT: 12.1 % (ref 11.6–15.1)
ETHANOL SERPL-MCNC: <10 MG/DL
GFR SERPL CREATININE-BSD FRML MDRD: 122 ML/MIN/1.73SQ M
GLUCOSE SERPL-MCNC: 90 MG/DL (ref 65–140)
HCT VFR BLD AUTO: 40.2 % (ref 34.8–46.1)
HGB BLD-MCNC: 13.8 G/DL (ref 11.5–15.4)
IMM GRANULOCYTES # BLD AUTO: 0.03 THOUSAND/UL (ref 0–0.2)
IMM GRANULOCYTES NFR BLD AUTO: 1 % (ref 0–2)
LIPASE SERPL-CCNC: 20 U/L (ref 11–82)
LYMPHOCYTES # BLD AUTO: 0.93 THOUSANDS/ÂΜL (ref 0.6–4.47)
LYMPHOCYTES NFR BLD AUTO: 15 % (ref 14–44)
MCH RBC QN AUTO: 30.5 PG (ref 26.8–34.3)
MCHC RBC AUTO-ENTMCNC: 34.3 G/DL (ref 31.4–37.4)
MCV RBC AUTO: 89 FL (ref 82–98)
METHADONE UR QL: NEGATIVE
MONOCYTES # BLD AUTO: 0.26 THOUSAND/ÂΜL (ref 0.17–1.22)
MONOCYTES NFR BLD AUTO: 4 % (ref 4–12)
NEUTROPHILS # BLD AUTO: 4.8 THOUSANDS/ÂΜL (ref 1.85–7.62)
NEUTS SEG NFR BLD AUTO: 78 % (ref 43–75)
NRBC BLD AUTO-RTO: 0 /100 WBCS
OPIATES UR QL SCN: NEGATIVE
OXYCODONE+OXYMORPHONE UR QL SCN: NEGATIVE
PCP UR QL: NEGATIVE
PLATELET # BLD AUTO: 255 THOUSANDS/UL (ref 149–390)
PMV BLD AUTO: 9 FL (ref 8.9–12.7)
POTASSIUM SERPL-SCNC: 3.7 MMOL/L (ref 3.5–5.3)
PROT SERPL-MCNC: 6.5 G/DL (ref 6.4–8.4)
RBC # BLD AUTO: 4.52 MILLION/UL (ref 3.81–5.12)
SODIUM SERPL-SCNC: 140 MMOL/L (ref 135–147)
THC UR QL: NEGATIVE
WBC # BLD AUTO: 6.09 THOUSAND/UL (ref 4.31–10.16)

## 2024-01-25 PROCEDURE — 36415 COLL VENOUS BLD VENIPUNCTURE: CPT

## 2024-01-25 PROCEDURE — 80053 COMPREHEN METABOLIC PANEL: CPT

## 2024-01-25 PROCEDURE — 83690 ASSAY OF LIPASE: CPT

## 2024-01-25 PROCEDURE — 85025 COMPLETE CBC W/AUTO DIFF WBC: CPT

## 2024-01-25 PROCEDURE — 99284 EMERGENCY DEPT VISIT MOD MDM: CPT

## 2024-01-25 PROCEDURE — 82077 ASSAY SPEC XCP UR&BREATH IA: CPT

## 2024-01-25 PROCEDURE — 99283 EMERGENCY DEPT VISIT LOW MDM: CPT

## 2024-01-25 PROCEDURE — 80307 DRUG TEST PRSMV CHEM ANLYZR: CPT

## 2024-01-25 RX ORDER — FAMOTIDINE 10 MG/ML
20 INJECTION, SOLUTION INTRAVENOUS ONCE
Status: DISCONTINUED | OUTPATIENT
Start: 2024-01-25 | End: 2024-01-25 | Stop reason: HOSPADM

## 2024-01-25 RX ORDER — PANTOPRAZOLE SODIUM 40 MG/10ML
40 INJECTION, POWDER, LYOPHILIZED, FOR SOLUTION INTRAVENOUS ONCE
Status: DISCONTINUED | OUTPATIENT
Start: 2024-01-25 | End: 2024-01-25 | Stop reason: HOSPADM

## 2024-01-25 RX ORDER — ONDANSETRON 2 MG/ML
4 INJECTION INTRAMUSCULAR; INTRAVENOUS ONCE
Status: DISCONTINUED | OUTPATIENT
Start: 2024-01-25 | End: 2024-01-25 | Stop reason: HOSPADM

## 2024-01-25 NOTE — ED PROVIDER NOTES
"History  Chief Complaint   Patient presents with    Insomnia     Insomnia x 1 week, states she has been awake since 1am Wednesday. Nausea x1wk, vomited 1 in last hour. poor appetite, bloated. Anxiety has been elevated over past week. Taking valtrex and feels like \" I'm drinking too much water\". Pt also she is breastfeeding.      Patient is a 28-year-old female who presents to the emergency room today for 1 week of symptoms.  Patient reports insomnia, generalized anxiety, abdominal bloating, nausea and vomiting.  Denies any other symptoms at this time.  Patient reports over the last 72 hours she has only napped for 2 hours.  Patient also reports that she is currently breast-feeding.  Patient reports that she had 2 glasses of wine tonight.  Denies any other drug use.  Has not taken any medication for symptom relief.    Denies SI/HI.  Patient reports that she would not like to talk to our crisis workers at this time.  Patient reports that she has a therapist that she sees every other week.  Last bowel movement yesterday and normal.          Prior to Admission Medications   Prescriptions Last Dose Informant Patient Reported? Taking?   Ascorbic Acid (vitamin C) 100 MG tablet  Self Yes No   Sig: Take 100 mg by mouth daily   LYSINE PO  Self Yes No   Sig: Take by mouth   Prenatal Vit-Fe Fumarate-FA (PRENATAL VITAMIN PO)  Self Yes No   Sig: Take by mouth   levonorgestrel (Kyleena) 19.5 MG intrauterine device  Self Yes No   Si each by Intrauterine route once   valACYclovir (VALTREX) 500 mg tablet   No No   Sig: Take 1 tablet (500 mg total) by mouth 2 (two) times a day      Facility-Administered Medications: None       Past Medical History:   Diagnosis Date    Abnormal Pap smear of cervix     Anxiety 02/10/2020    Epidermoid cyst of skin 2017    Herpes     Impetigo 2018    Miscarriage 2020    Last Assessment & Plan:  Treated w Cytotec then d+e,  Advised about pelvic rest for 2 wks Continue pnv , folic " acid if she is planning to get pregnant again. Call if any abnormal bleeding or pain.    Preeclampsia, severe 2021    S/P repeat low transverse  2023    Sleep disturbances 2018       Past Surgical History:   Procedure Laterality Date    ADENOIDECTOMY       SECTION  2021     SECTION      KNEE ARTHROSCOPY Left     SD  DELIVERY ONLY N/A 2023    Procedure:  SECTION () REPEAT;  Surgeon: Michelle Deluca MD;  Location: [facility] ;  Service: Obstetrics    TONSILLECTOMY      WISDOM TOOTH EXTRACTION         Family History   Problem Relation Age of Onset    Hypertension Mother     Rashes / Skin problems Father     Diabetes Maternal Grandmother     Hypertension Maternal Grandfather     Breast cancer Paternal Grandmother     Colon cancer Paternal Grandfather     Cancer Paternal Grandfather      I have reviewed and agree with the history as documented.    E-Cigarette/Vaping    E-Cigarette Use Never User      E-Cigarette/Vaping Substances    Nicotine No     THC No     CBD No     Flavoring No     Other No     Unknown No      Social History     Tobacco Use    Smoking status: Never    Smokeless tobacco: Never   Vaping Use    Vaping status: Never Used   Substance Use Topics    Alcohol use: Never    Drug use: Never       Review of Systems   Constitutional:  Negative for chills and fever.   HENT:  Negative for trouble swallowing and voice change.    Eyes:  Negative for photophobia and redness.   Respiratory:  Negative for cough and shortness of breath.    Cardiovascular:  Negative for chest pain and palpitations.   Gastrointestinal:  Positive for abdominal distention (Abdominal bloating), nausea and vomiting. Negative for abdominal pain.   Genitourinary:  Negative for dysuria, flank pain and hematuria.   Musculoskeletal:  Negative for arthralgias, back pain and joint swelling.   Skin:  Negative for color change and rash.   Neurological:  Negative for facial  asymmetry, speech difficulty and headaches.   Psychiatric/Behavioral:  Positive for sleep disturbance (Insomnia). Negative for confusion. The patient is nervous/anxious and is hyperactive.        Physical Exam  Physical Exam  Vitals and nursing note reviewed.   Constitutional:       General: She is not in acute distress.     Appearance: She is well-developed.   HENT:      Head: Normocephalic and atraumatic.      Mouth/Throat:      Mouth: Mucous membranes are moist.   Eyes:      Conjunctiva/sclera: Conjunctivae normal.   Cardiovascular:      Rate and Rhythm: Regular rhythm. Tachycardia present.      Heart sounds: No murmur heard.  Pulmonary:      Effort: Pulmonary effort is normal. No respiratory distress.      Breath sounds: Normal breath sounds.   Abdominal:      General: Abdomen is flat.      Palpations: Abdomen is soft.      Tenderness: There is no abdominal tenderness. There is no right CVA tenderness or left CVA tenderness.   Musculoskeletal:         General: No swelling.      Cervical back: Neck supple. No tenderness.   Lymphadenopathy:      Cervical: No cervical adenopathy.   Skin:     General: Skin is warm and dry.      Capillary Refill: Capillary refill takes less than 2 seconds.   Neurological:      Mental Status: She is alert and oriented to person, place, and time.   Psychiatric:         Attention and Perception: Perception normal.         Mood and Affect: Mood is anxious.         Speech: Speech is rapid and pressured.         Behavior: Behavior normal. Behavior is cooperative.         Thought Content: Thought content normal. Thought content does not include homicidal or suicidal ideation. Thought content does not include homicidal or suicidal plan.         Vital Signs  ED Triage Vitals   Temperature Pulse Respirations Blood Pressure SpO2   01/25/24 0340 01/25/24 0338 01/25/24 0338 01/25/24 0338 01/25/24 0338   98.4 °F (36.9 °C) 105 20 109/69 97 %      Temp Source Heart Rate Source Patient Position -  Orthostatic VS BP Location FiO2 (%)   01/25/24 0340 01/25/24 0338 01/25/24 0338 01/25/24 0338 --   Oral Monitor Sitting Left arm       Pain Score       01/25/24 0338       No Pain           Vitals:    01/25/24 0338 01/25/24 0450 01/25/24 0500 01/25/24 0530   BP: 109/69 127/74 125/64 117/66   Pulse: 105 99 97 94   Patient Position - Orthostatic VS: Sitting Lying Lying Lying         Visual Acuity      ED Medications  Medications   sodium chloride 0.9 % bolus 1,000 mL (1,000 mL Intravenous Not Given 1/25/24 0440)   Famotidine (PF) (PEPCID) injection 20 mg (20 mg Intravenous Not Given 1/25/24 0440)   pantoprazole (PROTONIX) injection 40 mg (40 mg Intravenous Not Given 1/25/24 0440)   ondansetron (ZOFRAN) injection 4 mg (4 mg Intravenous Not Given 1/25/24 0440)       Diagnostic Studies  Results Reviewed       Procedure Component Value Units Date/Time    Comprehensive metabolic panel [[id number]]  (Abnormal) Collected: 01/25/24 0443    Lab Status: Final result Specimen: Blood from Arm, Left Updated: 01/25/24 0515     Sodium 140 mmol/L      Potassium 3.7 mmol/L      Chloride 109 mmol/L      CO2 24 mmol/L      ANION GAP 7 mmol/L      BUN 13 mg/dL      Creatinine 0.61 mg/dL      Glucose 90 mg/dL      Calcium 9.0 mg/dL      AST 14 U/L      ALT 13 U/L      Alkaline Phosphatase 72 U/L      Total Protein 6.5 g/dL      Albumin 4.4 g/dL      Total Bilirubin 0.42 mg/dL      eGFR 122 ml/min/1.73sq m     Narrative:      National Kidney Disease Foundation guidelines for Chronic Kidney Disease (CKD):     Stage 1 with normal or high GFR (GFR > 90 mL/min/1.73 square meters)    Stage 2 Mild CKD (GFR = 60-89 mL/min/1.73 square meters)    Stage 3A Moderate CKD (GFR = 45-59 mL/min/1.73 square meters)    Stage 3B Moderate CKD (GFR = 30-44 mL/min/1.73 square meters)    Stage 4 Severe CKD (GFR = 15-29 mL/min/1.73 square meters)    Stage 5 End Stage CKD (GFR <15 mL/min/1.73 square meters)  Note: GFR calculation is accurate only with a steady  state creatinine    Lipase [[id number]]  (Normal) Collected: 01/25/24 0443    Lab Status: Final result Specimen: Blood from Arm, Left Updated: 01/25/24 0515     Lipase 20 u/L     Ethanol [[id number]]  (Normal) Collected: 01/25/24 0443    Lab Status: Final result Specimen: Blood from Arm, Left Updated: 01/25/24 0514     Ethanol Lvl <10 mg/dL     Rapid drug screen, urine [[id number]]  (Normal) Collected: 01/25/24 0448    Lab Status: Final result Specimen: Urine, Clean Catch Updated: 01/25/24 0513     Amph/Meth UR Negative     Barbiturate Ur Negative     Benzodiazepine Urine Negative     Cocaine Urine Negative     Methadone Urine Negative     Opiate Urine Negative     PCP Ur Negative     THC Urine Negative     Oxycodone Urine Negative    Narrative:      FOR MEDICAL PURPOSES ONLY.   IF CONFIRMATION NEEDED PLEASE CONTACT THE LAB WITHIN 5 DAYS.    Drug Screen Cutoff Levels:  AMPHETAMINE/METHAMPHETAMINES  1000 ng/mL  BARBITURATES     200 ng/mL  BENZODIAZEPINES     200 ng/mL  COCAINE      300 ng/mL  METHADONE      300 ng/mL  OPIATES      300 ng/mL  PHENCYCLIDINE     25 ng/mL  THC       50 ng/mL  OXYCODONE      100 ng/mL    CBC and differential [[id number]]  (Abnormal) Collected: 01/25/24 0443    Lab Status: Final result Specimen: Blood from Arm, Left Updated: 01/25/24 0458     WBC 6.09 Thousand/uL      RBC 4.52 Million/uL      Hemoglobin 13.8 g/dL      Hematocrit 40.2 %      MCV 89 fL      MCH 30.5 pg      MCHC 34.3 g/dL      RDW 12.1 %      MPV 9.0 fL      Platelets 255 Thousands/uL      nRBC 0 /100 WBCs      Neutrophils Relative 78 %      Immat GRANS % 1 %      Lymphocytes Relative 15 %      Monocytes Relative 4 %      Eosinophils Relative 1 %      Basophils Relative 1 %      Neutrophils Absolute 4.80 Thousands/µL      Immature Grans Absolute 0.03 Thousand/uL      Lymphocytes Absolute 0.93 Thousands/µL      Monocytes Absolute 0.26 Thousand/µL      Eosinophils Absolute 0.04 Thousand/µL      Basophils Absolute 0.03 Thousands/µL                     No orders to display              Procedures  Procedures         ED Course                               SBIRT 20yo+      Flowsheet Row Most Recent Value   Initial Alcohol Screen: US AUDIT-C     1. How often do you have a drink containing alcohol? 0 Filed at: 01/25/2024 0336   2. How many drinks containing alcohol do you have on a typical day you are drinking?  0 Filed at: 01/25/2024 0336   3b. FEMALE Any Age, or MALE 65+: How often do you have 4 or more drinks on one occassion? 0 Filed at: 01/25/2024 0336   Audit-C Score 0 Filed at: 01/25/2024 0336   KEE: How many times in the past year have you...    Used an illegal drug or used a prescription medication for non-medical reasons? Never Filed at: 01/25/2024 0336                      Medical Decision Making  28-year-old female presenting today for insomnia, generalized anxiety, abdominal bloating, nausea and vomiting.  Denies SI/HI, would not like to talk to crisis at this time.  Vital signs stable.  Initial pulse 105, improved during ED course to 94 bpm prior to discharge.  On exam, tachycardia present.  Patient appears anxious with rapid and pressured speech.  Patient alert and oriented.  Lab work unremarkable.  Patient declined medical management here.  Patient to follow-up with her primary care provider.  Patient to return to the emergency room for any worsening symptoms.  Patient educated on sleep hygiene.  Patient understands and agrees to discharge plan.    Problems Addressed:  Abdominal bloating: acute illness or injury  Anxiety: acute illness or injury  Insomnia: acute illness or injury  Nausea and vomiting: acute illness or injury    Amount and/or Complexity of Data Reviewed  Labs: ordered.    Risk  Prescription drug management.             Disposition  Final diagnoses:   Insomnia   Anxiety   Nausea and vomiting   Abdominal bloating     Time reflects when diagnosis was documented in both MDM as applicable and the Disposition within this  note       Time User Action Codes Description Comment    1/25/2024  5:32 AM Keyla Girno [G47.00] Insomnia     1/25/2024  5:32 AM Keyla Giron [F41.9] Anxiety     1/25/2024  5:32 AM Keyla Giron Add [R11.2] Nausea and vomiting     1/25/2024  5:32 AM Keyla Giron [R14.0] Abdominal bloating           ED Disposition       ED Disposition   Discharge    Condition   Stable    Date/Time   Thu Jan 25, 2024 0532    Comment   Rubina Crowe discharge to home/self care.                   Follow-up Information       Follow up With Specialties Details Why Contact Info Additional Information    Selina Calles,  Family Medicine   [address]  [address]  [facility] [address] [phone]       [facility] Emergency Medicine Go to  If symptoms worsen [address] [facility] [id number] [phone] [facility], [address] [place], [place], [address]            Discharge Medication List as of 1/25/2024  5:33 AM        CONTINUE these medications which have NOT CHANGED    Details   Ascorbic Acid (vitamin C) 100 MG tablet Take 100 mg by mouth daily, Historical Med      levonorgestrel (Kyleena) 19.5 MG intrauterine device 1 each by Intrauterine route once, Historical Med      LYSINE PO Take by mouth, Historical Med      Prenatal Vit-Fe Fumarate-FA (PRENATAL VITAMIN PO) Take by mouth, Historical Med      valACYclovir (VALTREX) 500 mg tablet Take 1 tablet (500 mg total) by mouth 2 (two) times a day, Starting Wed 10/4/2023, Until Mon 4/1/2024, Normal             No discharge procedures on file.    PDMP Review         Value Time User    PDMP Reviewed  Yes 5/29/2023  5:58 AM Shaheen Duran MD            ED Provider  Electronically Signed by             Keyla Giron PA-C  01/25/24 0652

## 2024-01-25 NOTE — DISCHARGE INSTRUCTIONS
Maintain good sleep hygiene.    Follow-up with your primary care.  Return to the emergency room for any worsening symptoms.

## 2024-01-26 DIAGNOSIS — F51.04 PSYCHOPHYSIOLOGICAL INSOMNIA: Primary | ICD-10-CM

## 2024-01-26 RX ORDER — TRAZODONE HYDROCHLORIDE 50 MG/1
50 TABLET ORAL
Qty: 30 TABLET | Refills: 1 | Status: SHIPPED | OUTPATIENT
Start: 2024-01-26

## 2024-01-30 ENCOUNTER — TELEMEDICINE (OUTPATIENT)
Dept: FAMILY MEDICINE CLINIC | Facility: CLINIC | Age: 29
End: 2024-01-30
Payer: COMMERCIAL

## 2024-01-30 DIAGNOSIS — F41.9 ANXIETY: ICD-10-CM

## 2024-01-30 DIAGNOSIS — F51.04 PSYCHOPHYSIOLOGICAL INSOMNIA: Primary | ICD-10-CM

## 2024-01-30 PROCEDURE — 99214 OFFICE O/P EST MOD 30 MIN: CPT | Performed by: FAMILY MEDICINE

## 2024-01-30 RX ORDER — HYDROXYZINE HYDROCHLORIDE 25 MG/1
25 TABLET, FILM COATED ORAL
Qty: 30 TABLET | Refills: 1 | Status: SHIPPED | OUTPATIENT
Start: 2024-01-30

## 2024-01-30 NOTE — PROGRESS NOTES
Virtual Regular Visit    Verification of patient location:    Patient is located at Home in the following state in which I hold an active license [place]      Assessment/Plan:    Problem List Items Addressed This Visit        Other    Psychophysiological insomnia - Primary    Relevant Medications    hydrOXYzine HCL (ATARAX) 25 mg tablet    Other Relevant Orders    TSH, 3rd generation with Free T4 reflex    Anxiety    Relevant Medications    hydrOXYzine HCL (ATARAX) 25 mg tablet    Other Relevant Orders    TSH, 3rd generation with Free T4 reflex     Unclear etiology of symptoms. Would recommend blood work to evaluate for thyroid dysfunction.   Discussed trial of alternative medication regimen -- we are somewhat limited in options due to pt's breastfeeding status. Pt states she would be willing to stop breastfeeding, but her daughter is not accepting bottles. Discussed consideration of daily SSRI (using the somnolence side effect to help with sleep); however, pt is hesitant due to knowing family members who had difficulty weaning off these medication. Reviewed that we could trial hydroxyzine, which is similar to benadryl but can help with anxiety as well -- reviewed that it is safe in limited use with breastfeeding (pt is only going to use at night, similar to benadryl). Also discussed consideration of being seen at [facility].     Pt ultimately agreeable to trial of hydroxyzine -- encouraged her to reach out tomorrow via [employer] with update. Also provided information for walk-in center should she choose to use that resource.          Reason for visit is   Chief Complaint   Patient presents with   • Virtual Regular Visit          Encounter provider Selina Calles DO    Provider located at [facility]  [address]  [facility] [id number]      Recent Visits  No visits were found meeting these conditions.  Showing recent visits within past 7 days and meeting all  "other requirements  Today's Visits  Date Type Provider Dept   01/30/24 Telemedicine DO Jesus Mendez    Showing today's visits and meeting all other requirements  Future Appointments  No visits were found meeting these conditions.  Showing future appointments within next 150 days and meeting all other requirements       The patient was identified by name and date of birth. Rubina Crowe was informed that this is a telemedicine visit and that the visit is being conducted through the [employer] platform. She agrees to proceed..  My office door was closed. No one else was in the room.  She acknowledged consent and understanding of privacy and security of the video platform. The patient has agreed to participate and understands they can discontinue the visit at any time.    Patient is aware this is a billable service.     Subjective  Rubina Crowe is a 28 y.o. female who presents to discuss insomnia.    HPI     Was seen in ED on 1/25 due to concern for water intoxication (bloated, vomiting, muscle twitching), deferred crisis evaluation. Pt messaged via [email or web address] on 1/26 due to lack of sleep for two days -- sent in Trazodone to try.     Today:   \"I'm really struggling\" \"I just have not been sleeping\"   Never had an issue sleeping before  Has been going to therapy, using [facility] daily and had been at \"such a good space\", but starting the weekend of 1/17, had a friend who was staying with them and even though it was nice having her there, it peaked her anxiety because she wanted her friend to have a good time but she also had to take care of her kids.   She was initially ok falling asleep, but if she woke up, she would be up the rest of the night. Then it became she couldn't fall asleep at all. Has only gotten about an hour of sleep the past two nights. She feels like there is a \"chemical imbalance\" or \"hormonal imbalance\" or \"deficiency\". Her daughter is a good sleeper, but her son is going through " "a sleep regression (some good nights, some not good nights)   Tried Trazodone without relief   When she is trying to fall asleep, there is nothing going through her mind (she has had anxious nights that she is able to journal or meditate through) and she is exhausted, but her body won't let her fall asleep   She tried Benadryl, which would make her exhausted, but when she would go to fall asleep, her body would \"jump\" and she would wake up again   Has lost a great deal of weight -- struggling to get her nutrition, feels \"shaky\" throughout the day   She feels like she is in a chronic state of \"fight or flight\"   Pt is scared she is going to die from lack of sleep   She did talk to her therapist about this, who thinks it may be anxiety related     Past Medical History:   Diagnosis Date   • Abnormal Pap smear of cervix    • Anxiety 02/10/2020   • Epidermoid cyst of skin 2017   • Herpes    • Impetigo 2018   • Miscarriage 2020    Last Assessment & Plan:  Treated w Cytotec then d+e,  Advised about pelvic rest for 2 wks Continue pnv , folic acid if she is planning to get pregnant again. Call if any abnormal bleeding or pain.   • Preeclampsia, severe 2021   • S/P repeat low transverse  2023   • Sleep disturbances 2018       Past Surgical History:   Procedure Laterality Date   • ADENOIDECTOMY     •  SECTION  2021   •  SECTION     • KNEE ARTHROSCOPY Left    • [place]  DELIVERY ONLY N/A 2023    Procedure:  SECTION () REPEAT;  Surgeon: Michelle Deluca MD;  Location: [facility] ;  Service: Obstetrics   • TONSILLECTOMY     • WISDOM TOOTH EXTRACTION         Current Outpatient Medications   Medication Sig Dispense Refill   • hydrOXYzine HCL (ATARAX) 25 mg tablet Take 1 tablet (25 mg total) by mouth daily at bedtime as needed for anxiety (insomnia) 30 tablet 1   • Ascorbic Acid (vitamin C) 100 MG tablet Take 100 mg by mouth daily     • " levonorgestrel (Kyleena) 19.5 MG intrauterine device 1 each by Intrauterine route once     • LYSINE PO Take by mouth     • Prenatal Vit-Fe Fumarate-FA (PRENATAL VITAMIN PO) Take by mouth     • traZODone (DESYREL) 50 mg tablet Take 1 tablet (50 mg total) by mouth daily at bedtime as needed for sleep 30 tablet 1   • valACYclovir (VALTREX) 500 mg tablet Take 1 tablet (500 mg total) by mouth 2 (two) times a day 180 tablet 3     No current facility-administered medications for this visit.        Allergies   Allergen Reactions   • Amoxicillin-Pot Clavulanate GI Intolerance     Other reaction(s): Unknown Reaction  Other reaction(s): STOMACH UPSET  Reaction when pt was toddler  Other reaction(s): STOMACH UPSET  Reaction when pt was toddler  Other reaction(s): Unknown Reaction  Other reaction(s): STOMACH UPSET  Reaction when pt was toddler       Review of Systems   Constitutional:  Positive for fatigue.   Psychiatric/Behavioral:  Positive for sleep disturbance. The patient is nervous/anxious.        Video Exam    There were no vitals filed for this visit.    Physical Exam  Vitals and nursing note reviewed.   Constitutional:       General: She is not in acute distress.     Appearance: Normal appearance.   HENT:      Head: Normocephalic and atraumatic.   Pulmonary:      Effort: Pulmonary effort is normal. No respiratory distress.   Neurological:      Mental Status: She is alert.      Comments: Grossly intact    Psychiatric:         Mood and Affect: Affect is tearful.          Visit Time  Total Visit Duration: 25

## 2024-01-30 NOTE — LETTER
January 30, 2024     Patient: Rubina Crowe  YOB: 1995  Date of Visit: 1/30/2024      To Whom it May Concern:    Rubina Crowe is under my professional care. Rubina was seen via virtual visit on 1/30/2024. Please excuse her from 01/29/2024-02/02/2024.     If you have any questions or concerns, please don't hesitate to call.         Sincerely,          Selina Calles,         CC: No Recipients

## 2024-02-16 DIAGNOSIS — B00.1 RECURRENT COLD SORES: ICD-10-CM

## 2024-02-16 RX ORDER — VALACYCLOVIR HYDROCHLORIDE 500 MG/1
500 TABLET, FILM COATED ORAL DAILY
Qty: 90 TABLET | Refills: 1 | Status: SHIPPED | OUTPATIENT
Start: 2024-02-16 | End: 2024-08-14

## 2024-04-04 ENCOUNTER — OFFICE VISIT (OUTPATIENT)
Dept: FAMILY MEDICINE CLINIC | Facility: CLINIC | Age: 29
End: 2024-04-04
Payer: COMMERCIAL

## 2024-04-04 VITALS
WEIGHT: 135 LBS | HEIGHT: 65 IN | SYSTOLIC BLOOD PRESSURE: 118 MMHG | BODY MASS INDEX: 22.49 KG/M2 | OXYGEN SATURATION: 98 % | DIASTOLIC BLOOD PRESSURE: 70 MMHG | HEART RATE: 82 BPM | TEMPERATURE: 98 F

## 2024-04-04 DIAGNOSIS — J01.00 ACUTE NON-RECURRENT MAXILLARY SINUSITIS: Primary | ICD-10-CM

## 2024-04-04 PROCEDURE — 99213 OFFICE O/P EST LOW 20 MIN: CPT

## 2024-04-04 RX ORDER — CEFDINIR 300 MG/1
300 CAPSULE ORAL EVERY 12 HOURS SCHEDULED
Qty: 14 CAPSULE | Refills: 0 | Status: SHIPPED | OUTPATIENT
Start: 2024-04-04 | End: 2024-04-11

## 2024-04-04 NOTE — PROGRESS NOTES
"Name: Rubina Crowe      : 1995      MRN: [id number]  Encounter Provider: Smiley Jin PA-C  Encounter Date: 2024   Encounter department: [facility]    Assessment & Plan     1. Acute non-recurrent maxillary sinusitis  -     cefdinir (OMNICEF) 300 mg capsule; Take 1 capsule (300 mg total) by mouth every 12 (twelve) hours for 7 days    Patient presents for evaluation of cough, congestion, left sinus pain/swelling x one week. Patient declines covid/flu testing. Physical exam revealed intense left maxillary pressure and pain to palpation with obvious left sided facial swelling surrounding the maxillary sinus. Therefore recommended treating for sinusitis with antibiotic - patient allergic to augmentin (listed as GI intolerance however patient does not remember as she was a child) - discussed the cross reactivity with cephalosporins but that we could try cefdinir and she can monitor for side effects which patient was agreeable with, cefdinir is also safe in breast feeding. Therefore prescribed Cefdinir 300 mg BID x 7 days. Discussed potential side effects; advised to take with food. Otherwise recommended nasal saline spray, continue tylenol, rest, and stay hydrated. If anaphylactic reaction advised ER. To call with any questions/concerns. Advised ER if symptoms worsen or swelling increases. Patient agreeable.        Subjective      CC: left sinus pain/pressure, cough, congestion     Patient presents for evaluation of intense left sinus pain and pressure, congestion, cough, clogged ears x 1 week. She has been taking tylenol for pain which has been helping. Patient is breast feeding so she is limited in what she can take. Notes this morning her sinus hurt so bad it was worse than having a .       Review of Systems   Constitutional:  Negative for appetite change, chills, diaphoresis, fatigue and fever.   HENT:  Positive for congestion, ear pain (\"clogged\"), facial swelling (L " "sided, maxillary sinus), rhinorrhea, sinus pressure and sinus pain. Negative for sore throat.    Respiratory:  Positive for cough. Negative for chest tightness, shortness of breath and wheezing.    Cardiovascular:  Negative for chest pain and palpitations.   Gastrointestinal:  Negative for abdominal pain, blood in stool, constipation, nausea and vomiting.   Neurological:  Negative for dizziness, light-headedness and headaches.       Current Outpatient Medications on File Prior to Visit   Medication Sig    Ascorbic Acid (vitamin C) 100 MG tablet Take 100 mg by mouth daily    hydrOXYzine HCL (ATARAX) 25 mg tablet Take 1 tablet (25 mg total) by mouth daily at bedtime as needed for anxiety (insomnia)    levonorgestrel (Kyleena) 19.5 MG intrauterine device 1 each by Intrauterine route once    LYSINE PO Take by mouth    Prenatal Vit-Fe Fumarate-FA (PRENATAL VITAMIN PO) Take by mouth    traZODone (DESYREL) 50 mg tablet Take 1 tablet (50 mg total) by mouth daily at bedtime as needed for sleep    valACYclovir (VALTREX) 500 mg tablet Take 1 tablet (500 mg total) by mouth daily       Objective     /70   Pulse 82   Temp 98 °F (36.7 °C)   Ht 5' 5\" (1.651 m)   Wt 61.2 kg (135 lb)   SpO2 98%   BMI 22.47 kg/m²     Physical Exam  Vitals reviewed.   Constitutional:       General: She is not in acute distress.     Appearance: Normal appearance. She is not ill-appearing or diaphoretic.   HENT:      Head: Normocephalic and atraumatic.        Right Ear: Tympanic membrane, ear canal and external ear normal. There is no impacted cerumen.      Left Ear: Tympanic membrane, ear canal and external ear normal. There is no impacted cerumen.      Nose: Congestion present. No rhinorrhea.      Right Sinus: No maxillary sinus tenderness or frontal sinus tenderness.      Left Sinus: Maxillary sinus tenderness present. No frontal sinus tenderness.      Mouth/Throat:      Mouth: Mucous membranes are moist.      Pharynx: Oropharynx is clear. " No oropharyngeal exudate or posterior oropharyngeal erythema.   Eyes:      General:         Right eye: No discharge.         Left eye: No discharge.      Conjunctiva/sclera: Conjunctivae normal.   Cardiovascular:      Rate and Rhythm: Normal rate and regular rhythm.      Pulses: Normal pulses.      Heart sounds: Normal heart sounds. No murmur heard.  Pulmonary:      Effort: Pulmonary effort is normal. No respiratory distress.      Breath sounds: Normal breath sounds. No wheezing, rhonchi or rales.   Musculoskeletal:         General: Normal range of motion.      Cervical back: Normal range of motion and neck supple.   Lymphadenopathy:      Cervical: No cervical adenopathy.   Skin:     General: Skin is warm.   Neurological:      General: No focal deficit present.      Mental Status: She is alert.      Gait: Gait normal.   Psychiatric:         Mood and Affect: Mood normal.       Smiley Jin PA-C

## 2024-05-06 DIAGNOSIS — B00.1 RECURRENT COLD SORES: ICD-10-CM

## 2024-05-06 RX ORDER — VALACYCLOVIR HYDROCHLORIDE 500 MG/1
500 TABLET, FILM COATED ORAL DAILY
Qty: 90 TABLET | Refills: 1 | Status: SHIPPED | OUTPATIENT
Start: 2024-05-06 | End: 2024-11-02

## 2024-06-24 ENCOUNTER — OFFICE VISIT (OUTPATIENT)
Dept: FAMILY MEDICINE CLINIC | Facility: CLINIC | Age: 29
End: 2024-06-24
Payer: COMMERCIAL

## 2024-06-24 VITALS
TEMPERATURE: 98.7 F | OXYGEN SATURATION: 99 % | DIASTOLIC BLOOD PRESSURE: 84 MMHG | BODY MASS INDEX: 21.16 KG/M2 | HEIGHT: 65 IN | HEART RATE: 85 BPM | SYSTOLIC BLOOD PRESSURE: 134 MMHG | WEIGHT: 127 LBS

## 2024-06-24 DIAGNOSIS — F51.01 PRIMARY INSOMNIA: ICD-10-CM

## 2024-06-24 DIAGNOSIS — F41.9 ANXIETY: Primary | ICD-10-CM

## 2024-06-24 PROCEDURE — 99214 OFFICE O/P EST MOD 30 MIN: CPT

## 2024-06-24 PROCEDURE — 93000 ELECTROCARDIOGRAM COMPLETE: CPT

## 2024-06-24 RX ORDER — HYDROXYZINE HYDROCHLORIDE 10 MG/1
10 TABLET, FILM COATED ORAL EVERY 6 HOURS PRN
Qty: 30 TABLET | Refills: 0 | Status: SHIPPED | OUTPATIENT
Start: 2024-06-24

## 2024-06-24 NOTE — PROGRESS NOTES
Ambulatory Visit  Name: Rubina Crowe      : 1995      MRN: [id number]  Encounter Provider: Smiley Jin PA-C  Encounter Date: 2024   Encounter department: [facility]    Assessment & Plan   1. Anxiety  -     hydrOXYzine HCL (ATARAX) 10 mg tablet; Take 1 tablet (10 mg total) by mouth every 6 (six) hours as needed for anxiety  -     POCT ECG  2. Primary insomnia  -     hydrOXYzine HCL (ATARAX) 10 mg tablet; Take 1 tablet (10 mg total) by mouth every 6 (six) hours as needed for anxiety    Patient presents for re-evaluation of insomnia/anxiety.   Discussed different medication options for insomnia in depth today including trazodone (has already tried and did not work well), hydroxyzine, melatonin, zolpidem, etc.   Ultimately patient would like to try the hydroxyzine since it is an as needed medication. Due to her fear for long QT, an EKG was completed today which showed her Qtc to be 436 (normal for women < 460). Discussed this with patient; therefore she is going to try the hydroxyzine. Sent in lower dose of 10 mg to start with to make sure she does not have any side effects however she does have the 25 mg at home in case she wants to increase to higher dose if not working.   Did discuss seeing sleep medicine; pt would rather try the hydroxyzine first.   Therefore will follow up in four weeks with PCP to see how she is doing on the medication.   To call with any questions/concerns sooner.      History of Present Illness     CC: insomnia, anxiety     Patient presents for re-evaluation of insomnia and anxiety. This has been an ongoing issue for about the last six months which she has seen her PCP, Dr. Calles for as well. She has tried trazodone in the past which she did not feel worked for her. She was prescribed hydroxyzine as well given the anxiety component of her insomnia (reports anxious thoughts at night about not being able to fall asleep which  makes it worse). However  patient has not taken the hydroxyzine due to fear regarding side effects. She read online it can cause long QT which has worried her.   Patient has been seeing a therapist weekly for her anxiety who recommended looking into medical marijuana potentially; did advise that this is not something I can prescribe but that to research providers in the area who do prescribe this for more information.  Patient does not want to take a medication that she has to take daily which is the reason why she has not tried a daily anxiety medication as well. She would like a medication that she can just take as needed.   She has been taking benadryl which helps; has also tried melatonin OTC which helped but then after a few weeks seems to stop and she starts having issues sleeping again.   She did read that weaning from breast feeding can cause trouble sleeping; has been done fully breast feeding for about two months now but has not improved at all.   Gets anywhere from 2 to 6 hours of sleep at night; varies. Trouble falling asleep and staying asleep.       Review of Systems   Respiratory:  Negative for chest tightness and shortness of breath.    Cardiovascular:  Negative for chest pain and palpitations.   Psychiatric/Behavioral:  Positive for sleep disturbance.      Past Medical History:   Diagnosis Date    Abnormal Pap smear of cervix     Anxiety 02/10/2020    Epidermoid cyst of skin 2017    Herpes     Impetigo 2018    Miscarriage 2020    Last Assessment & Plan:  Treated w Cytotec then d+e,  Advised about pelvic rest for 2 wks Continue pnv , folic acid if she is planning to get pregnant again. Call if any abnormal bleeding or pain.    Preeclampsia, severe 2021    S/P repeat low transverse  2023    Sleep disturbances 2018     Past Surgical History:   Procedure Laterality Date    ADENOIDECTOMY       SECTION  2021     SECTION      KNEE ARTHROSCOPY Left     [place]   DELIVERY ONLY N/A 2023    Procedure:  SECTION () REPEAT;  Surgeon: Michelle Deluca MD;  Location: [facility] ;  Service: Obstetrics    TONSILLECTOMY      WISDOM TOOTH EXTRACTION       Family History   Problem Relation Age of Onset    Hypertension Mother     Rashes / Skin problems Father     Diabetes Maternal Grandmother     Hypertension Maternal Grandfather     Breast cancer Paternal Grandmother     Colon cancer Paternal Grandfather     Cancer Paternal Grandfather      Social History     Tobacco Use    Smoking status: Never    Smokeless tobacco: Never   Vaping Use    Vaping status: Never Used   Substance and Sexual Activity    Alcohol use: Never    Drug use: Never    Sexual activity: Yes     Partners: Male     Birth control/protection: None     Current Outpatient Medications on File Prior to Visit   Medication Sig    Ascorbic Acid (vitamin C) 100 MG tablet Take 100 mg by mouth daily    hydrOXYzine HCL (ATARAX) 25 mg tablet Take 1 tablet (25 mg total) by mouth daily at bedtime as needed for anxiety (insomnia)    levonorgestrel (Kyleena) 19.5 MG intrauterine device 1 each by Intrauterine route once    LYSINE PO Take by mouth    Prenatal Vit-Fe Fumarate-FA (PRENATAL VITAMIN PO) Take by mouth    traZODone (DESYREL) 50 mg tablet Take 1 tablet (50 mg total) by mouth daily at bedtime as needed for sleep    valACYclovir (VALTREX) 500 mg tablet Take 1 tablet (500 mg total) by mouth daily     Allergies   Allergen Reactions    Amoxicillin-Pot Clavulanate GI Intolerance     Other reaction(s): Unknown Reaction  Other reaction(s): STOMACH UPSET  Reaction when pt was toddler  Other reaction(s): STOMACH UPSET  Reaction when pt was toddler  Other reaction(s): Unknown Reaction  Other reaction(s): STOMACH UPSET  Reaction when pt was toddler     Immunization History   Administered Date(s) Administered    COVID-19 PFIZER VACCINE 0.3 ML IM 09/10/2021, 09/10/2021, 10/01/2021, 10/01/2021    DTP 1995, 1994,  "04/04/1994, 06/15/1995    DTaP 5 1993, 02/02/1994, 04/04/1994, 06/15/1995, 09/12/1997, 08/15/2008    HPV Quadrivalent 06/21/2012, 08/22/2013, 05/28/2014    Hep B, adult 1995, 1995, 04/04/1994, 03/16/2021, 06/01/2021, 10/08/2021, 10/08/2021    Hib (PRP-OMP) 1993, 02/02/1994, 04/04/1994, 01/11/1995    INFLUENZA 09/13/2019, 10/07/2019, 08/26/2020, 09/23/2021, 09/23/2021, 10/17/2022    Influenza Quadrivalent, 6-35 Months IM 09/17/2017    Influenza, seasonal, injectable 11/01/2016    MMR 01/11/1995, 01/11/1995, 09/12/1997, 09/12/1997, 10/08/2021, 10/08/2021    Meningococcal, Unknown Serogroups 09/26/2012    OPV 1993, 02/02/1994, 06/15/1995, 09/12/1997    Rubella 01/12/1995    Tdap 07/13/2021, 04/04/2023    Varicella 11/02/1998, 11/02/1998     Objective     /84   Pulse 85   Temp 98.7 °F (37.1 °C)   Ht 5' 5\" (1.651 m)   Wt 57.6 kg (127 lb)   SpO2 99%   BMI 21.13 kg/m²     Physical Exam  Vitals reviewed.   Constitutional:       General: She is not in acute distress.     Appearance: Normal appearance. She is not ill-appearing or diaphoretic.   HENT:      Head: Normocephalic and atraumatic.      Right Ear: External ear normal.      Left Ear: External ear normal.      Nose: Nose normal.      Mouth/Throat:      Mouth: Mucous membranes are moist.   Eyes:      General:         Right eye: No discharge.         Left eye: No discharge.      Conjunctiva/sclera: Conjunctivae normal.   Cardiovascular:      Rate and Rhythm: Normal rate and regular rhythm.      Pulses: Normal pulses.      Heart sounds: Normal heart sounds. No murmur heard.  Pulmonary:      Effort: Pulmonary effort is normal. No respiratory distress.      Breath sounds: Normal breath sounds. No wheezing, rhonchi or rales.   Musculoskeletal:         General: Normal range of motion.      Cervical back: Normal range of motion.   Skin:     General: Skin is warm.   Neurological:      General: No focal deficit present.      Mental " Status: She is alert.      Gait: Gait normal.   Psychiatric:         Mood and Affect: Mood normal.       Administrative Statements   I have spent a total time of 35 minutes on 06/24/24 In caring for this patient including Risks and benefits of tx options, Instructions for management, Patient and family education, Reviewing / ordering tests, medicine, procedures  , and Obtaining or reviewing history  .

## 2024-07-19 ENCOUNTER — OFFICE VISIT (OUTPATIENT)
Dept: OBGYN CLINIC | Facility: CLINIC | Age: 29
End: 2024-07-19
Payer: COMMERCIAL

## 2024-07-19 VITALS
WEIGHT: 136 LBS | HEIGHT: 65 IN | BODY MASS INDEX: 22.66 KG/M2 | DIASTOLIC BLOOD PRESSURE: 80 MMHG | SYSTOLIC BLOOD PRESSURE: 124 MMHG

## 2024-07-19 DIAGNOSIS — T83.32XA INTRAUTERINE CONTRACEPTIVE DEVICE THREADS LOST, INITIAL ENCOUNTER: ICD-10-CM

## 2024-07-19 DIAGNOSIS — Z30.431 IUD CHECK UP: Primary | ICD-10-CM

## 2024-07-19 PROCEDURE — 99214 OFFICE O/P EST MOD 30 MIN: CPT | Performed by: PHYSICIAN ASSISTANT

## 2024-07-19 NOTE — PROGRESS NOTES
Assessment/Plan:   - IUD in place via TVUS done in office today  - Will order formal US for patient reassurance  - Call for concerns  - RTO for annual     Diagnoses and all orders for this visit:    IUD check up    Intrauterine contraceptive device threads lost, initial encounter          Subjective:     Patient ID: Rubina Crowe is a 28 y.o. female.    Rubina is a 27YO  WF presenting to the office for an IUD string check. Patient had her mirena placed in 2023. She states that she and her  are unable to feel the strings and she is worried that the IUD fell out.         Review of Systems   Constitutional:  Negative for fever.   Gastrointestinal:  Negative for abdominal pain, nausea and vomiting.   Genitourinary:  Negative for pelvic pain and vaginal bleeding.         Objective:     Physical Exam  Constitutional:       Appearance: Normal appearance.   HENT:      Head: Normocephalic and atraumatic.   Pulmonary:      Effort: Pulmonary effort is normal.   Genitourinary:     General: Normal vulva.      Exam position: Lithotomy position.      Pubic Area: No rash.       Labia:         Right: No rash or lesion.         Left: No rash or lesion.       Vagina: Normal. No vaginal discharge or lesions.      Cervix: Lesion (IUD strings not obviously visualized on exam, possible visualization within the cervix, but not definitive) present. No discharge.      Comments:   TVUS performed in office today.   IUD visualized centrally within endometrial canal   Skin:     General: Skin is warm and dry.      Findings: No lesion or rash.   Neurological:      General: No focal deficit present.      Mental Status: She is alert.   Psychiatric:         Mood and Affect: Mood normal.         Behavior: Behavior normal.                 Ultrasound Probe Disinfection    A transvaginal ultrasound was performed.   Prior to use, disinfection was performed with High Level Disinfection Process (Trophon)  Probe serial number [id number]:   [id number] was used    Clarissa Bourne PA-C  07/19/24  3:04 PM    I have spent a total time of 30 minutes in caring for this patient on the day of the visit/encounter including Patient and family education, Counseling / Coordination of care, Documenting in the medical record, Reviewing / ordering tests, medicine, procedures  , and Obtaining or reviewing history  .

## 2024-07-22 NOTE — PROGRESS NOTES
"Ambulatory Visit  Name: Rubina Crowe      : 1995      MRN: [id number]  Encounter Provider: Selina Calles DO  Encounter Date: 2024   Encounter department: [facility]    Assessment & Plan   1. Psychophysiological insomnia  Assessment & Plan:  Doing well on current medication regimen/with therapy, continue   2. Anxiety  Assessment & Plan:  See insomnia          History of Present Illness     HPI    Pt presents for anxiety/insomnia follow up. Feels \"good\" with Hydroxyzine -- takes it nightly unless she is having a glass of wine. Sleeping 11-6 or 11-7 (her son does wake up a few times per night, but able to fall right back to sleep), feels rested in AM. Has been \"very very good\" with the \"OCD part\" of her anxiety -- her family is noticing changes as well. Continues to work with her therapist.     Review of Systems   Psychiatric/Behavioral:  Positive for sleep disturbance (improved). The patient is nervous/anxious (improved).      Medical History Reviewed by provider this encounter:  Tobacco  Allergies  Meds  Problems  Med Hx  Surg Hx  Fam Hx       Objective     /76   Pulse 70   Temp 98.4 °F (36.9 °C)   Ht 5' 5\" (1.651 m)   Wt 60.8 kg (134 lb)   LMP 2024 (Exact Date)   SpO2 98%   BMI 22.30 kg/m²     Physical Exam  Vitals and nursing note reviewed.   Constitutional:       General: She is not in acute distress.     Appearance: Normal appearance.   Pulmonary:      Effort: Pulmonary effort is normal. No respiratory distress.   Neurological:      Mental Status: She is alert.      Comments: Grossly intact   Psychiatric:         Mood and Affect: Mood normal.       Administrative Statements           "

## 2024-07-23 ENCOUNTER — OFFICE VISIT (OUTPATIENT)
Dept: FAMILY MEDICINE CLINIC | Facility: CLINIC | Age: 29
End: 2024-07-23
Payer: COMMERCIAL

## 2024-07-23 VITALS
TEMPERATURE: 98.4 F | SYSTOLIC BLOOD PRESSURE: 128 MMHG | DIASTOLIC BLOOD PRESSURE: 76 MMHG | WEIGHT: 134 LBS | OXYGEN SATURATION: 98 % | HEIGHT: 65 IN | BODY MASS INDEX: 22.33 KG/M2 | HEART RATE: 70 BPM

## 2024-07-23 DIAGNOSIS — F51.04 PSYCHOPHYSIOLOGICAL INSOMNIA: Primary | ICD-10-CM

## 2024-07-23 DIAGNOSIS — F41.9 ANXIETY: ICD-10-CM

## 2024-07-23 PROCEDURE — 99213 OFFICE O/P EST LOW 20 MIN: CPT | Performed by: FAMILY MEDICINE

## 2024-07-24 DIAGNOSIS — B00.1 RECURRENT COLD SORES: ICD-10-CM

## 2024-07-24 RX ORDER — VALACYCLOVIR HYDROCHLORIDE 500 MG/1
500 TABLET, FILM COATED ORAL DAILY
Qty: 90 TABLET | Refills: 0 | Status: SHIPPED | OUTPATIENT
Start: 2024-07-24 | End: 2025-01-20

## 2024-08-04 DIAGNOSIS — F51.01 PRIMARY INSOMNIA: ICD-10-CM

## 2024-08-04 DIAGNOSIS — F41.9 ANXIETY: ICD-10-CM

## 2024-08-05 RX ORDER — HYDROXYZINE HYDROCHLORIDE 10 MG/1
10 TABLET, FILM COATED ORAL EVERY 6 HOURS PRN
Qty: 30 TABLET | Refills: 5 | Status: SHIPPED | OUTPATIENT
Start: 2024-08-05

## 2024-09-26 DIAGNOSIS — B00.1 RECURRENT COLD SORES: ICD-10-CM

## 2024-09-26 RX ORDER — VALACYCLOVIR HYDROCHLORIDE 500 MG/1
500 TABLET, FILM COATED ORAL DAILY
Qty: 90 TABLET | Refills: 0 | Status: SHIPPED | OUTPATIENT
Start: 2024-09-26 | End: 2025-03-25

## 2024-10-02 DIAGNOSIS — F51.01 PRIMARY INSOMNIA: ICD-10-CM

## 2024-10-02 DIAGNOSIS — F41.9 ANXIETY: ICD-10-CM

## 2024-10-02 RX ORDER — HYDROXYZINE HYDROCHLORIDE 10 MG/1
10 TABLET, FILM COATED ORAL EVERY 6 HOURS PRN
Qty: 30 TABLET | Refills: 0 | Status: SHIPPED | OUTPATIENT
Start: 2024-10-02

## 2024-10-18 ENCOUNTER — ANNUAL EXAM (OUTPATIENT)
Dept: OBGYN CLINIC | Facility: CLINIC | Age: 29
End: 2024-10-18
Payer: COMMERCIAL

## 2024-10-18 VITALS
DIASTOLIC BLOOD PRESSURE: 80 MMHG | BODY MASS INDEX: 22.66 KG/M2 | WEIGHT: 136 LBS | SYSTOLIC BLOOD PRESSURE: 118 MMHG | HEIGHT: 65 IN

## 2024-10-18 DIAGNOSIS — T83.32XD INTRAUTERINE CONTRACEPTIVE DEVICE THREADS LOST, SUBSEQUENT ENCOUNTER: ICD-10-CM

## 2024-10-18 DIAGNOSIS — Z01.419 WOMEN'S ANNUAL ROUTINE GYNECOLOGICAL EXAMINATION: Primary | ICD-10-CM

## 2024-10-18 PROCEDURE — S0612 ANNUAL GYNECOLOGICAL EXAMINA: HCPCS | Performed by: PHYSICIAN ASSISTANT

## 2024-10-18 NOTE — PROGRESS NOTES
ASSESSMENT & PLAN:   Diagnoses and all orders for this visit:    Women's annual routine gynecological examination    Intrauterine contraceptive device threads lost, subsequent encounter  Comments:  TVUS in office 2024 confirms placement. Formal ultrasound ordered.  Orders:  -     US pelvis complete w transvaginal; Future          The following were reviewed in today's visit: ASCCP guidelines, Gardisil vaccination, STD testing breast self exam, STD testing, exercise, and healthy diet.    Patient to return to office in yearly for annual exam.     All questions have been answered to her satisfaction.        CC:  Annual Gynecologic Examination  Chief Complaint   Patient presents with    Gynecologic Exam     Pt is here for her yearly exam. Pap utd.  Kyleena inserted 8/11/23  10/17/22 pap nilm/ neg hpv         HPI: Rubina Crowe is a 29 y.o.  who presents for annual gynecologic examination.  She has the following concerns:    None at this time. Doing well. Getting periods with IUD.       Health Maintenance:    Exercise: intermittently  Breast exams/breast awareness: yes    Past Medical History:   Diagnosis Date    Abnormal Pap smear of cervix     Anxiety 02/10/2020    Epidermoid cyst of skin 2017    Herpes     Impetigo 2018    Miscarriage 2020    Last Assessment & Plan:  Treated w Cytotec then d+e,  Advised about pelvic rest for 2 wks Continue pnv , folic acid if she is planning to get pregnant again. Call if any abnormal bleeding or pain.    Preeclampsia, severe 2021    S/P repeat low transverse  2023    Sleep disturbances 2018       Past Surgical History:   Procedure Laterality Date    ADENOIDECTOMY       SECTION  2021     SECTION      KNEE ARTHROSCOPY Left     HI  DELIVERY ONLY N/A 2023    Procedure:  SECTION () REPEAT;  Surgeon: Michelle Deluca MD;  Location: [facility] ;  Service: Obstetrics    TONSILLECTOMY       WISDOM TOOTH EXTRACTION         Past OB/Gyn History:   Patient's last menstrual period was 10/16/2024 (exact date).    Last Pap: 2022 : no abnormalities  History of abnormal Pap smear: no  HPV vaccine completed: yes    Patient is currently sexually active.   STD testing: no  Current contraception: IUD      Family History  Family History   Problem Relation Age of Onset    Hypertension Mother     Rashes / Skin problems Father     Diabetes Maternal Grandmother     Hypertension Maternal Grandfather     Breast cancer Paternal Grandmother     Colon cancer Paternal Grandfather     Cancer Paternal Grandfather        Family history of uterine or ovarian cancer: no  Family history of breast cancer: yes  Family history of colon cancer: yes    Social History:  Social History     Socioeconomic History    Marital status: /Civil Union     Spouse name: Not on file    Number of children: Not on file    Years of education: Not on file    Highest education level: Not on file   Occupational History    Not on file   Tobacco Use    Smoking status: Never    Smokeless tobacco: Never   Vaping Use    Vaping status: Never Used   Substance and Sexual Activity    Alcohol use: Yes     Comment: social    Drug use: Never    Sexual activity: Yes     Partners: Male     Birth control/protection: None   Other Topics Concern    Not on file   Social History Narrative    Caffeine use      Social Determinants of Health     Financial Resource Strain: Not on file   Food Insecurity: Not on file   Transportation Needs: Not on file   Physical Activity: Not on file   Stress: Not on file   Social Connections: Not on file   Intimate Partner Violence: Not on file   Housing Stability: Not on file     Domestic violence screen: negative    Allergies:  Allergies   Allergen Reactions    Amoxicillin-Pot Clavulanate GI Intolerance     Other reaction(s): Unknown Reaction  Other reaction(s): STOMACH UPSET  Reaction when pt was toddler  Other reaction(s): STOMACH  "UPSET  Reaction when pt was toddler  Other reaction(s): Unknown Reaction  Other reaction(s): STOMACH UPSET  Reaction when pt was toddler       Medications:    Current Outpatient Medications:     Ascorbic Acid (vitamin C) 100 MG tablet, Take 100 mg by mouth daily, Disp: , Rfl:     hydrOXYzine HCL (ATARAX) 10 mg tablet, Take 1 tablet (10 mg total) by mouth every 6 (six) hours as needed for anxiety (Patient taking differently: Take 10 mg by mouth every 6 (six) hours as needed for anxiety As needed), Disp: 30 tablet, Rfl: 0    levonorgestrel (Kyleena) 19.5 MG intrauterine device, 1 each by Intrauterine route once, Disp: , Rfl:     LYSINE PO, Take by mouth, Disp: , Rfl:     Prenatal Vit-Fe Fumarate-FA (PRENATAL VITAMIN PO), Take by mouth, Disp: , Rfl:     traZODone (DESYREL) 50 mg tablet, Take 1 tablet (50 mg total) by mouth daily at bedtime as needed for sleep, Disp: 30 tablet, Rfl: 1    valACYclovir (VALTREX) 500 mg tablet, Take 1 tablet (500 mg total) by mouth daily, Disp: 90 tablet, Rfl: 0    Review of Systems:  Review of Systems   Constitutional:  Negative for chills, fever and unexpected weight change.   Respiratory:  Negative for shortness of breath.    Cardiovascular:  Negative for chest pain.   Gastrointestinal:  Negative for abdominal pain, diarrhea, nausea and vomiting.   Skin:  Negative for rash.   Psychiatric/Behavioral:  Negative for dysphoric mood. The patient is not nervous/anxious.          Physical Exam:  /80 (BP Location: Right arm, Patient Position: Sitting, Cuff Size: Standard)   Ht 5' 5\" (1.651 m)   Wt 61.7 kg (136 lb)   LMP 10/16/2024 (Exact Date)   BMI 22.63 kg/m²    Physical Exam  Constitutional:       Appearance: Normal appearance.   Genitourinary:      Vulva and urethral meatus normal.      No lesions in the vagina.      Right Labia: No rash or lesions.     Left Labia: No lesions or rash.     No vaginal discharge, erythema or bleeding.        Right Adnexa: not tender and no mass " present.     Left Adnexa: not tender and no mass present.     No cervical discharge or lesion.      No IUD strings visualized.      Uterus is not tender.   Breasts:     Breasts are symmetrical.      Right: No mass or skin change.      Left: No mass or skin change.   HENT:      Head: Normocephalic and atraumatic.   Cardiovascular:      Rate and Rhythm: Normal rate and regular rhythm.      Heart sounds: Normal heart sounds. No murmur heard.     No friction rub. No gallop.   Pulmonary:      Effort: Pulmonary effort is normal.      Breath sounds: Normal breath sounds. No wheezing, rhonchi or rales.   Abdominal:      General: Abdomen is flat. There is no distension.      Palpations: Abdomen is soft.      Tenderness: There is no abdominal tenderness.   Musculoskeletal:      Cervical back: Neck supple.   Lymphadenopathy:      Upper Body:      Right upper body: No axillary adenopathy.      Left upper body: No axillary adenopathy.   Neurological:      General: No focal deficit present.      Mental Status: She is alert.   Skin:     General: Skin is warm and dry.   Psychiatric:         Mood and Affect: Mood normal.         Behavior: Behavior normal.   Vitals reviewed.

## 2024-11-02 DIAGNOSIS — F51.01 PRIMARY INSOMNIA: ICD-10-CM

## 2024-11-02 DIAGNOSIS — F41.9 ANXIETY: ICD-10-CM

## 2024-11-04 RX ORDER — HYDROXYZINE HYDROCHLORIDE 10 MG/1
10 TABLET, FILM COATED ORAL EVERY 6 HOURS PRN
Qty: 30 TABLET | Refills: 3 | Status: SHIPPED | OUTPATIENT
Start: 2024-11-04

## 2024-11-12 ENCOUNTER — HOSPITAL ENCOUNTER (OUTPATIENT)
Dept: ULTRASOUND IMAGING | Facility: HOSPITAL | Age: 29
Discharge: HOME | End: 2024-11-12
Payer: COMMERCIAL

## 2024-11-12 DIAGNOSIS — T83.32XD INTRAUTERINE CONTRACEPTIVE DEVICE THREADS LOST, SUBSEQUENT ENCOUNTER: ICD-10-CM

## 2024-11-12 PROCEDURE — 76830 TRANSVAGINAL US NON-OB: CPT

## 2024-11-12 PROCEDURE — 76856 US EXAM PELVIC COMPLETE: CPT

## 2024-11-14 ENCOUNTER — RA CDI HCC (OUTPATIENT)
Dept: OTHER | Facility: HOSPITAL | Age: 29
End: 2024-11-14

## 2024-11-14 NOTE — PROGRESS NOTES
HCC coding opportunities       Chart reviewed, no opportunity found: CHART REVIEWED, NO OPPORTUNITY FOUND        Patients Insurance        Commercial Insurance: [employer]

## 2024-11-18 ENCOUNTER — RESULTS FOLLOW-UP (OUTPATIENT)
Dept: OBGYN CLINIC | Facility: CLINIC | Age: 29
End: 2024-11-18

## 2024-11-18 DIAGNOSIS — B00.1 RECURRENT COLD SORES: ICD-10-CM

## 2024-11-19 RX ORDER — VALACYCLOVIR HYDROCHLORIDE 500 MG/1
500 TABLET, FILM COATED ORAL DAILY
Qty: 90 TABLET | Refills: 0 | Status: SHIPPED | OUTPATIENT
Start: 2024-11-19 | End: 2025-05-18

## 2024-11-20 NOTE — PROGRESS NOTES
Name: Rubina Crowe      : 1995      MRN: [id number]  Encounter Provider: Selina Calles DO  Encounter Date: 2024   Encounter department: [facility]  :  Assessment & Plan  Healthcare maintenance  BP WNL   Update labs as below   Pap UTD   Vaccinations: TDap UTD, Flu UTD, COVID defers   Encouraged regular physical activity, varied diet, and regular dental/eye exams          Anxiety    Orders:    CBC and differential; Future    TSH, 3rd generation with Free T4 reflex; Future    Psychophysiological insomnia    Orders:    CBC and differential; Future    TSH, 3rd generation with Free T4 reflex; Future    Hemorrhoids, unspecified hemorrhoid type    Orders:    Ambulatory referral to Colorectal Surgery; Future    Screening for diabetes mellitus    Orders:    Comprehensive metabolic panel; Future    Screening, lipid    Orders:    Lipid panel; Future           History of Present Illness     HPI    Pt presents for annual physical     Review of Systems   Constitutional:  Negative for unexpected weight change.   HENT:  Negative for congestion, ear pain, rhinorrhea and sore throat.    Eyes:  Negative for visual disturbance.   Respiratory:  Negative for cough and shortness of breath.    Cardiovascular:  Negative for chest pain, palpitations and leg swelling.   Gastrointestinal:  Negative for abdominal pain, blood in stool, constipation and diarrhea.        (+) hemorrhoid s/p pregnancy -- bothersome, would like to have it removed   Endocrine: Negative for polyuria.   Genitourinary:  Negative for dysuria, hematuria and menstrual problem (regular, light with Kyleena).   Skin:         Had two cold sore breakouts    Neurological:  Negative for dizziness (intermittent vertigo symptoms -- able to manage at home/avoid triggers) and headaches.   Psychiatric/Behavioral:  Positive for sleep disturbance (taking hydroxyzine PRN).      Medical History Reviewed by provider this encounter:  Tobacco  Allergies  " Meds  Problems  Med Hx  Surg Hx  Fam Hx     .     Objective   /74   Pulse 97   Temp 98.4 °F (36.9 °C)   Ht 5' 5\" (1.651 m)   Wt 63.3 kg (139 lb 8 oz)   LMP 11/14/2024 (Approximate)   SpO2 97%   BMI 23.21 kg/m²      Physical Exam  Vitals and nursing note reviewed.   Constitutional:       General: She is not in acute distress.     Appearance: She is well-developed.   HENT:      Head: Normocephalic and atraumatic.      Right Ear: Tympanic membrane, ear canal and external ear normal.      Left Ear: Tympanic membrane, ear canal and external ear normal.      Nose: Nose normal. No rhinorrhea.      Mouth/Throat:      Mouth: Mucous membranes are moist.      Pharynx: No oropharyngeal exudate or posterior oropharyngeal erythema.   Eyes:      Conjunctiva/sclera: Conjunctivae normal.   Cardiovascular:      Rate and Rhythm: Normal rate and regular rhythm.   Pulmonary:      Effort: Pulmonary effort is normal. No respiratory distress.      Breath sounds: Normal breath sounds.   Abdominal:      General: Bowel sounds are normal. There is no distension.      Palpations: Abdomen is soft.      Tenderness: There is no abdominal tenderness.   Musculoskeletal:      Right lower leg: No edema.      Left lower leg: No edema.   Lymphadenopathy:      Cervical: No cervical adenopathy.   Skin:     General: Skin is warm and dry.   Neurological:      Mental Status: She is alert.      Comments: Grossly intact   Psychiatric:         Mood and Affect: Mood normal.         "

## 2024-11-21 ENCOUNTER — APPOINTMENT (OUTPATIENT)
Dept: LAB | Facility: CLINIC | Age: 29
End: 2024-11-21
Payer: COMMERCIAL

## 2024-11-21 ENCOUNTER — OFFICE VISIT (OUTPATIENT)
Dept: FAMILY MEDICINE CLINIC | Facility: CLINIC | Age: 29
End: 2024-11-21
Payer: COMMERCIAL

## 2024-11-21 VITALS
TEMPERATURE: 98.4 F | DIASTOLIC BLOOD PRESSURE: 74 MMHG | WEIGHT: 139.5 LBS | OXYGEN SATURATION: 97 % | SYSTOLIC BLOOD PRESSURE: 118 MMHG | BODY MASS INDEX: 23.24 KG/M2 | HEART RATE: 97 BPM | HEIGHT: 65 IN

## 2024-11-21 DIAGNOSIS — F51.04 PSYCHOPHYSIOLOGICAL INSOMNIA: ICD-10-CM

## 2024-11-21 DIAGNOSIS — Z13.1 SCREENING FOR DIABETES MELLITUS: ICD-10-CM

## 2024-11-21 DIAGNOSIS — Z13.220 SCREENING, LIPID: ICD-10-CM

## 2024-11-21 DIAGNOSIS — F41.9 ANXIETY: ICD-10-CM

## 2024-11-21 DIAGNOSIS — Z00.00 HEALTHCARE MAINTENANCE: Primary | ICD-10-CM

## 2024-11-21 DIAGNOSIS — K64.9 HEMORRHOIDS, UNSPECIFIED HEMORRHOID TYPE: ICD-10-CM

## 2024-11-21 LAB
ALBUMIN SERPL BCG-MCNC: 4.5 G/DL (ref 3.5–5)
ALP SERPL-CCNC: 61 U/L (ref 34–104)
ALT SERPL W P-5'-P-CCNC: 12 U/L (ref 7–52)
ANION GAP SERPL CALCULATED.3IONS-SCNC: 5 MMOL/L (ref 4–13)
AST SERPL W P-5'-P-CCNC: 14 U/L (ref 13–39)
BASOPHILS # BLD AUTO: 0.09 THOUSANDS/ÂΜL (ref 0–0.1)
BASOPHILS NFR BLD AUTO: 2 % (ref 0–1)
BILIRUB SERPL-MCNC: 0.57 MG/DL (ref 0.2–1)
BUN SERPL-MCNC: 14 MG/DL (ref 5–25)
CALCIUM SERPL-MCNC: 8.8 MG/DL (ref 8.4–10.2)
CHLORIDE SERPL-SCNC: 108 MMOL/L (ref 96–108)
CHOLEST SERPL-MCNC: 156 MG/DL (ref ?–200)
CO2 SERPL-SCNC: 27 MMOL/L (ref 21–32)
CREAT SERPL-MCNC: 0.79 MG/DL (ref 0.6–1.3)
EOSINOPHIL # BLD AUTO: 0.16 THOUSAND/ÂΜL (ref 0–0.61)
EOSINOPHIL NFR BLD AUTO: 3 % (ref 0–6)
ERYTHROCYTE [DISTWIDTH] IN BLOOD BY AUTOMATED COUNT: 13 % (ref 11.6–15.1)
GFR SERPL CREATININE-BSD FRML MDRD: 100 ML/MIN/1.73SQ M
GLUCOSE P FAST SERPL-MCNC: 87 MG/DL (ref 65–99)
HCT VFR BLD AUTO: 41.9 % (ref 34.8–46.1)
HDLC SERPL-MCNC: 84 MG/DL
HGB BLD-MCNC: 14 G/DL (ref 11.5–15.4)
IMM GRANULOCYTES # BLD AUTO: 0.02 THOUSAND/UL (ref 0–0.2)
IMM GRANULOCYTES NFR BLD AUTO: 0 % (ref 0–2)
LDLC SERPL CALC-MCNC: 65 MG/DL (ref 0–100)
LYMPHOCYTES # BLD AUTO: 1.76 THOUSANDS/ÂΜL (ref 0.6–4.47)
LYMPHOCYTES NFR BLD AUTO: 29 % (ref 14–44)
MCH RBC QN AUTO: 31.2 PG (ref 26.8–34.3)
MCHC RBC AUTO-ENTMCNC: 33.4 G/DL (ref 31.4–37.4)
MCV RBC AUTO: 93 FL (ref 82–98)
MONOCYTES # BLD AUTO: 0.46 THOUSAND/ÂΜL (ref 0.17–1.22)
MONOCYTES NFR BLD AUTO: 7 % (ref 4–12)
NEUTROPHILS # BLD AUTO: 3.69 THOUSANDS/ÂΜL (ref 1.85–7.62)
NEUTS SEG NFR BLD AUTO: 59 % (ref 43–75)
NONHDLC SERPL-MCNC: 72 MG/DL
NRBC BLD AUTO-RTO: 0 /100 WBCS
PLATELET # BLD AUTO: 276 THOUSANDS/UL (ref 149–390)
PMV BLD AUTO: 9.6 FL (ref 8.9–12.7)
POTASSIUM SERPL-SCNC: 4.1 MMOL/L (ref 3.5–5.3)
PROT SERPL-MCNC: 6.4 G/DL (ref 6.4–8.4)
RBC # BLD AUTO: 4.49 MILLION/UL (ref 3.81–5.12)
SODIUM SERPL-SCNC: 140 MMOL/L (ref 135–147)
TRIGL SERPL-MCNC: 37 MG/DL (ref ?–150)
TSH SERPL DL<=0.05 MIU/L-ACNC: 1.42 UIU/ML (ref 0.45–4.5)
WBC # BLD AUTO: 6.18 THOUSAND/UL (ref 4.31–10.16)

## 2024-11-21 PROCEDURE — 84443 ASSAY THYROID STIM HORMONE: CPT

## 2024-11-21 PROCEDURE — 36415 COLL VENOUS BLD VENIPUNCTURE: CPT

## 2024-11-21 PROCEDURE — 80053 COMPREHEN METABOLIC PANEL: CPT

## 2024-11-21 PROCEDURE — 99395 PREV VISIT EST AGE 18-39: CPT | Performed by: FAMILY MEDICINE

## 2024-11-21 PROCEDURE — 80061 LIPID PANEL: CPT

## 2024-11-21 PROCEDURE — 85025 COMPLETE CBC W/AUTO DIFF WBC: CPT

## 2024-11-22 ENCOUNTER — RESULTS FOLLOW-UP (OUTPATIENT)
Dept: FAMILY MEDICINE CLINIC | Facility: CLINIC | Age: 29
End: 2024-11-22

## 2024-12-04 DIAGNOSIS — F41.9 ANXIETY: ICD-10-CM

## 2024-12-04 DIAGNOSIS — F51.01 PRIMARY INSOMNIA: ICD-10-CM

## 2024-12-05 RX ORDER — HYDROXYZINE HYDROCHLORIDE 10 MG/1
10 TABLET, FILM COATED ORAL EVERY 6 HOURS PRN
Qty: 30 TABLET | Refills: 3 | Status: SHIPPED | OUTPATIENT
Start: 2024-12-05

## 2024-12-11 DIAGNOSIS — B00.1 RECURRENT COLD SORES: ICD-10-CM

## 2024-12-11 RX ORDER — VALACYCLOVIR HYDROCHLORIDE 500 MG/1
500 TABLET, FILM COATED ORAL DAILY
Qty: 30 TABLET | Refills: 5 | Status: SHIPPED | OUTPATIENT
Start: 2024-12-11 | End: 2025-06-09

## 2024-12-13 ENCOUNTER — OFFICE VISIT (OUTPATIENT)
Age: 29
End: 2024-12-13
Payer: COMMERCIAL

## 2024-12-13 VITALS
WEIGHT: 139 LBS | HEART RATE: 76 BPM | SYSTOLIC BLOOD PRESSURE: 121 MMHG | OXYGEN SATURATION: 97 % | HEIGHT: 65 IN | DIASTOLIC BLOOD PRESSURE: 78 MMHG | BODY MASS INDEX: 23.16 KG/M2

## 2024-12-13 DIAGNOSIS — K64.4 SKIN TAG OF PERIANAL REGION: Primary | ICD-10-CM

## 2024-12-13 DIAGNOSIS — K64.9 HEMORRHOIDS, UNSPECIFIED HEMORRHOID TYPE: ICD-10-CM

## 2024-12-13 PROCEDURE — 99202 OFFICE O/P NEW SF 15 MIN: CPT | Performed by: COLON & RECTAL SURGERY

## 2024-12-13 PROCEDURE — 46600 DIAGNOSTIC ANOSCOPY SPX: CPT | Performed by: COLON & RECTAL SURGERY

## 2024-12-14 NOTE — PROGRESS NOTES
"Name: Rubina Crowe      : 1995      MRN: [id number]  Encounter Provider: Octaviano Gutierrez MD  Encounter Date: 2024   Encounter department: [facility] [place]  :  Assessment & Plan  Skin tag of perianal region  Patient was advised that the skin tag could be removed as an office procedure.  Patient will schedule skin tag removal post-holidays.  Patient advised to clean anal margin skin,, post defecation with Balneol lotion to minimize irritation skin tag       Hemorrhoids, unspecified hemorrhoid type  Currently without significant hemorrhoidal changes requiring surgical intervention  Orders:    Ambulatory referral to Colorectal Surgery        History of Present Illness   HPI  Rubina Crowe is a 29 y.o. female who presents for evaluation and treatment of residual anal skin tag  History obtained from: patient    Review of Systems   Constitutional:  Negative for chills and fever.   HENT:  Negative for ear pain and sore throat.    Eyes:  Negative for pain and visual disturbance.   Respiratory:  Negative for cough and shortness of breath.    Cardiovascular:  Negative for chest pain and palpitations.   Gastrointestinal:  Negative for abdominal pain and vomiting.   Genitourinary:  Negative for dysuria and hematuria.   Musculoskeletal:  Negative for arthralgias and back pain.   Skin:  Negative for color change and rash.   Neurological:  Negative for seizures and syncope.   All other systems reviewed and are negative.    Medical History Reviewed by provider this encounter:  Tobacco  Allergies  Meds  Problems  Med Hx  Surg Hx  Fam Hx     .     Objective   /78   Pulse 76   Ht 5' 5\" (1.651 m)   Wt 63 kg (139 lb)   LMP 2024 (Approximate)   SpO2 97%   BMI 23.13 kg/m²      Physical Exam  Vitals and nursing note reviewed.   Constitutional:       Appearance: Normal appearance.   HENT:      Head: Normocephalic and atraumatic.   Skin:     General: Skin is warm " and dry.   Neurological:      Mental Status: She is alert and oriented to person, place, and time.   Psychiatric:         Mood and Affect: Mood normal.         Behavior: Behavior normal.         Lower Endoscopy    Date/Time: 12/13/2024 9:20 AM    Performed by: Octaviano Gutierrez MD  Authorized by: Octaviano Gutierrez MD    Verbal consent obtained?: Yes    Written consent obtained?: No    Risks and benefits: Risks, benefits and alternatives were discussed    Consent given by:  Patient  Patient states understanding of procedure being performed: Yes    Patient identity confirmed:  Verbally with patient  Patient sedated: No    Scope type:  Anoscope  External exam performed: Yes    Perianal skin tags: Yes (Left lateral anal verge)    Perianal maceration: No    Perianal induration: No    Perianal erythema: No    External hemorrhoids: No    Digital exam performed: Yes    Laxity of anal sphincter: No    Internal hemorrhoids: No    Intraluminal mass: No    Inflammation: No    Anal fissures: No    Anal fistulae: No    Anal stricture: No    Abscess: No    Procedure termination:  Procedure complete  Patient tolerance:  Patient tolerated the procedure well with no immediate complications

## 2024-12-18 ENCOUNTER — OFFICE VISIT (OUTPATIENT)
Age: 29
End: 2024-12-18
Payer: COMMERCIAL

## 2024-12-18 ENCOUNTER — TELEPHONE (OUTPATIENT)
Age: 29
End: 2024-12-18

## 2024-12-18 VITALS
OXYGEN SATURATION: 98 % | HEIGHT: 65 IN | HEART RATE: 89 BPM | DIASTOLIC BLOOD PRESSURE: 69 MMHG | WEIGHT: 139 LBS | BODY MASS INDEX: 23.16 KG/M2 | SYSTOLIC BLOOD PRESSURE: 119 MMHG

## 2024-12-18 DIAGNOSIS — K64.4 ANAL SKIN TAG: ICD-10-CM

## 2024-12-18 DIAGNOSIS — K64.4 ANAL SKIN TAG: Primary | ICD-10-CM

## 2024-12-18 PROCEDURE — 99214 OFFICE O/P EST MOD 30 MIN: CPT | Performed by: COLON & RECTAL SURGERY

## 2024-12-18 PROCEDURE — 46220 EXCISE ANAL EXT TAG/PAPILLA: CPT | Performed by: COLON & RECTAL SURGERY

## 2024-12-18 PROCEDURE — 88304 TISSUE EXAM BY PATHOLOGIST: CPT | Performed by: PATHOLOGY

## 2024-12-18 NOTE — TELEPHONE ENCOUNTER
Pt called stated she is going to be late today and would like to move her appt to a later time. Nothing available for later time but 11 or 2pm, pt would like to speak with someone in the office. Warm transferred over to clerical team for further assistance

## 2024-12-18 NOTE — PROGRESS NOTES
"Name: Rubina Crowe      : 1995      MRN: [id number]  Encounter Provider: Octaviano Gutierrez MD  Encounter Date: 2024   Encounter department: . [facility] [place]  :  Assessment & Plan  Anal skin tag  Excisional therapy completed  Orders:    Tissue Exam        History of Present Illness   HPI  Rubina Crowe is a 29 y.o. female who presents right anterior lateral skin tag for surgical excision  History obtained from: patient    Review of Systems   Constitutional:  Negative for chills and fever.   HENT:  Negative for ear pain and sore throat.    Eyes:  Negative for pain and visual disturbance.   Respiratory:  Negative for cough and shortness of breath.    Cardiovascular:  Negative for chest pain and palpitations.   Gastrointestinal:  Negative for abdominal pain and vomiting.   Genitourinary:  Negative for dysuria and hematuria.   Musculoskeletal:  Negative for arthralgias and back pain.   Skin:  Negative for color change and rash.   Neurological:  Negative for seizures and syncope.   All other systems reviewed and are negative.    Medical History Reviewed by provider this encounter:  Tobacco  Allergies  Meds  Problems  Med Hx  Surg Hx  Fam Hx     .     Objective   /69   Pulse 89   Ht 5' 5\" (1.651 m)   Wt 63 kg (139 lb)   LMP 2024 (Approximate)   SpO2 98%   BMI 23.13 kg/m²      Physical Exam  Vitals and nursing note reviewed.   Constitutional:       Appearance: Normal appearance.   Neurological:      Mental Status: She is alert and oriented to person, place, and time.   Psychiatric:         Mood and Affect: Mood normal.         Behavior: Behavior normal.     Anal Excision Procedures    Performed by: Octaviano Gutierrez MD  Authorized by: Octaviano Gutierrez MD    Universal Protocol:     Verbal consent obtained?: Yes      Written consent obtained?: No      Risks and benefits: Risks, benefits and alternatives were discussed      Consent given by:  " Patient    Patient states understanding of procedure being performed: Yes      Patient identity confirmed:  Verbally with patient  A time out verifies correct patient, procedure, equipment, support staff and site/side marked as required:   Procedure Type: excision of single external papillae or tag, anus    Excision Details:   Destruction method: surgical removal    Width of defect (cm):  3  Repair diameter:  4  Patient tolerance:  Patient tolerated the procedure well with no immediate complications  Additiional Procedure Intervention Details:  Patient placed in left lateral decubitus position.  Buttock retracted perianal skin disinfected.  The area was raised with ethyl chloride and 3 cc of 1% lidocaine 1-200,000 of epinephrine was used to perform a field block right anterior lateral skin lesion.  Metzenbaum scissors were used to excise the skin tag.  The defect was closed with a running continuous 4-0 chromic suture.  The wound was completely hemostatic upon completion.  Topical antibiotic placed dressing placed patient left satisfactory and stable condition.  Prior to leaving patient given 3 extra strength Tylenol.    Specimen submitted for pathologic review

## 2024-12-23 PROCEDURE — 88304 TISSUE EXAM BY PATHOLOGIST: CPT | Performed by: PATHOLOGY

## 2024-12-28 DIAGNOSIS — B00.1 RECURRENT COLD SORES: ICD-10-CM

## 2024-12-28 RX ORDER — VALACYCLOVIR HYDROCHLORIDE 500 MG/1
500 TABLET, FILM COATED ORAL DAILY
Qty: 30 TABLET | Refills: 0 | Status: SHIPPED | OUTPATIENT
Start: 2024-12-28 | End: 2025-06-26

## 2025-01-10 ENCOUNTER — OFFICE VISIT (OUTPATIENT)
Age: 30
End: 2025-01-10
Payer: COMMERCIAL

## 2025-01-10 VITALS
OXYGEN SATURATION: 96 % | HEIGHT: 65 IN | BODY MASS INDEX: 23.16 KG/M2 | DIASTOLIC BLOOD PRESSURE: 69 MMHG | WEIGHT: 139 LBS | HEART RATE: 79 BPM | SYSTOLIC BLOOD PRESSURE: 100 MMHG

## 2025-01-10 DIAGNOSIS — K64.4 ANAL SKIN TAG: Primary | ICD-10-CM

## 2025-01-10 PROCEDURE — 99213 OFFICE O/P EST LOW 20 MIN: CPT | Performed by: COLON & RECTAL SURGERY

## 2025-01-11 NOTE — PROGRESS NOTES
Assessment/Plan:  Status post excision anal margin skin tag.    Plan: Pathology reviewed no further treatment needed at this time    Patient advised to maintain high-fiber diet fiber supplementation follow-up as needed.  No problem-specific Assessment & Plan notes found for this encounter.       Diagnoses and all orders for this visit:    Anal skin tag              Subjective:      Patient ID: Rubina Crowe is a 29 y.o. female.    HPI patient is status post incision and will margin skin tag.  Pathology reveals fibroepithelial skin tag  The following portions of the patient's history were reviewed and updated as appropriate:   She  has a past medical history of Abnormal Pap smear of cervix, Allergic, Anxiety (02/10/2020), Epidermoid cyst of skin (2017), Herpes, Impetigo (2018), Miscarriage (2020), Preeclampsia, severe (2021), S/P repeat low transverse  (2023), and Sleep disturbances (2018).  She   Patient Active Problem List    Diagnosis Date Noted    Psychophysiological insomnia 2024    Recurrent cold sores 2022    Anxiety 02/10/2020    Chronic sinusitis 2018    Environmental allergies 2018     She  has a past surgical history that includes [place] tooth extraction; Tonsillectomy; ADENOIDECTOMY; Knee arthroscopy (Left);  section (2021); pr  delivery only (N/A, 2023); and  section.  Her family history includes Breast cancer in her paternal grandmother; Cancer in her paternal grandfather; Colon cancer in her paternal grandfather; Diabetes in her maternal grandmother; Hypertension in her maternal grandfather and mother; Rashes / Skin problems in her father.  She  reports that she has never smoked. She has never been exposed to tobacco smoke. She has never used smokeless tobacco. She reports current alcohol use. She reports that she does not use drugs.  Current Outpatient Medications   Medication Sig Dispense Refill     "Ascorbic Acid (vitamin C) 100 MG tablet Take 100 mg by mouth daily      hydrOXYzine HCL (ATARAX) 10 mg tablet Take 1 tablet (10 mg total) by mouth every 6 (six) hours as needed for anxiety 30 tablet 3    levonorgestrel (Kyleena) 19.5 MG intrauterine device 1 each by Intrauterine route once      LYSINE PO Take by mouth      valACYclovir (VALTREX) 500 mg tablet Take 1 tablet (500 mg total) by mouth daily 30 tablet 0     No current facility-administered medications for this visit.     Current Outpatient Medications on File Prior to Visit   Medication Sig    Ascorbic Acid (vitamin C) 100 MG tablet Take 100 mg by mouth daily    hydrOXYzine HCL (ATARAX) 10 mg tablet Take 1 tablet (10 mg total) by mouth every 6 (six) hours as needed for anxiety    levonorgestrel (Kyleena) 19.5 MG intrauterine device 1 each by Intrauterine route once    LYSINE PO Take by mouth    valACYclovir (VALTREX) 500 mg tablet Take 1 tablet (500 mg total) by mouth daily     No current facility-administered medications on file prior to visit.     She is allergic to amoxicillin-pot clavulanate..    Review of Systems   Constitutional:  Negative for chills and fever.   HENT:  Negative for ear pain and sore throat.    Eyes:  Negative for pain and visual disturbance.   Respiratory:  Negative for cough and shortness of breath.    Cardiovascular:  Negative for chest pain and palpitations.   Gastrointestinal:  Negative for abdominal pain and vomiting.   Genitourinary:  Negative for dysuria and hematuria.   Musculoskeletal:  Negative for arthralgias and back pain.   Skin:  Negative for color change and rash.   Neurological:  Negative for seizures and syncope.   All other systems reviewed and are negative.        Objective:      /69   Pulse 79   Ht 5' 5\" (1.651 m)   Wt 63 kg (139 lb)   SpO2 96%   BMI 23.13 kg/m²          Colorectal Physical Exam    Inspection of anal margin reveals excision site anal skin tag to be completely healed without " complication.

## 2025-01-20 DIAGNOSIS — B00.1 RECURRENT COLD SORES: ICD-10-CM

## 2025-01-20 RX ORDER — VALACYCLOVIR HYDROCHLORIDE 500 MG/1
500 TABLET, FILM COATED ORAL DAILY
Qty: 30 TABLET | Refills: 0 | Status: SHIPPED | OUTPATIENT
Start: 2025-01-20 | End: 2025-02-20

## 2025-01-21 RX ORDER — VALACYCLOVIR HYDROCHLORIDE 500 MG/1
500 TABLET, FILM COATED ORAL DAILY
Qty: 30 TABLET | Refills: 0 | OUTPATIENT
Start: 2025-01-21 | End: 2025-02-21

## 2025-01-28 DIAGNOSIS — F41.9 ANXIETY: ICD-10-CM

## 2025-01-28 DIAGNOSIS — F51.01 PRIMARY INSOMNIA: ICD-10-CM

## 2025-01-28 RX ORDER — HYDROXYZINE HYDROCHLORIDE 10 MG/1
10 TABLET, FILM COATED ORAL EVERY 6 HOURS PRN
Qty: 30 TABLET | Refills: 2 | Status: SHIPPED | OUTPATIENT
Start: 2025-01-28

## 2025-02-10 DIAGNOSIS — B00.1 RECURRENT COLD SORES: ICD-10-CM

## 2025-02-11 RX ORDER — VALACYCLOVIR HYDROCHLORIDE 500 MG/1
500 TABLET, FILM COATED ORAL DAILY
Qty: 90 TABLET | Refills: 1 | Status: SHIPPED | OUTPATIENT
Start: 2025-02-11 | End: 2025-08-10

## 2025-03-04 DIAGNOSIS — B00.1 RECURRENT COLD SORES: ICD-10-CM

## 2025-03-06 RX ORDER — VALACYCLOVIR HYDROCHLORIDE 500 MG/1
500 TABLET, FILM COATED ORAL DAILY
Qty: 90 TABLET | Refills: 0 | OUTPATIENT
Start: 2025-03-06 | End: 2025-09-02

## 2025-03-19 DIAGNOSIS — F51.01 PRIMARY INSOMNIA: ICD-10-CM

## 2025-03-19 DIAGNOSIS — F41.9 ANXIETY: ICD-10-CM

## 2025-03-20 RX ORDER — HYDROXYZINE HYDROCHLORIDE 10 MG/1
10 TABLET, FILM COATED ORAL EVERY 6 HOURS PRN
Qty: 30 TABLET | Refills: 2 | Status: SHIPPED | OUTPATIENT
Start: 2025-03-20

## 2025-03-25 DIAGNOSIS — B00.1 RECURRENT COLD SORES: ICD-10-CM

## 2025-03-25 RX ORDER — VALACYCLOVIR HYDROCHLORIDE 500 MG/1
500 TABLET, FILM COATED ORAL DAILY
Qty: 90 TABLET | Refills: 0 | Status: SHIPPED | OUTPATIENT
Start: 2025-03-25 | End: 2025-09-21

## 2025-04-12 DIAGNOSIS — B00.1 RECURRENT COLD SORES: ICD-10-CM

## 2025-04-14 RX ORDER — VALACYCLOVIR HYDROCHLORIDE 500 MG/1
500 TABLET, FILM COATED ORAL DAILY
Qty: 90 TABLET | Refills: 0 | Status: SHIPPED | OUTPATIENT
Start: 2025-04-14 | End: 2025-10-11

## 2025-05-09 DIAGNOSIS — F41.9 ANXIETY: ICD-10-CM

## 2025-05-09 DIAGNOSIS — F51.01 PRIMARY INSOMNIA: ICD-10-CM

## 2025-05-09 RX ORDER — HYDROXYZINE HYDROCHLORIDE 10 MG/1
10 TABLET, FILM COATED ORAL EVERY 6 HOURS PRN
Qty: 30 TABLET | Refills: 0 | Status: SHIPPED | OUTPATIENT
Start: 2025-05-09

## 2025-05-14 DIAGNOSIS — B00.1 RECURRENT COLD SORES: ICD-10-CM

## 2025-05-14 RX ORDER — VALACYCLOVIR HYDROCHLORIDE 500 MG/1
500 TABLET, FILM COATED ORAL DAILY
Qty: 90 TABLET | Refills: 1 | Status: SHIPPED | OUTPATIENT
Start: 2025-05-14 | End: 2025-11-10

## 2025-06-06 DIAGNOSIS — B00.1 RECURRENT COLD SORES: ICD-10-CM

## 2025-06-06 RX ORDER — VALACYCLOVIR HYDROCHLORIDE 500 MG/1
500 TABLET, FILM COATED ORAL DAILY
Qty: 30 TABLET | Refills: 5 | Status: SHIPPED | OUTPATIENT
Start: 2025-06-06 | End: 2025-12-03

## 2025-06-16 ENCOUNTER — TELEPHONE (OUTPATIENT)
Dept: DERMATOLOGY | Facility: CLINIC | Age: 30
End: 2025-06-16

## (undated) DEVICE — ABDOMINAL PAD: Brand: DERMACEA

## (undated) DEVICE — SKIN MARKER DUAL TIP WITH RULER CAP, FLEXIBLE RULER AND LABELS: Brand: DEVON

## (undated) DEVICE — HEMOSTAT POWDER ADSORB SURGICEL 3GM

## (undated) DEVICE — PACK C-SECTION PBDS

## (undated) DEVICE — GLOVE INDICATOR PI UNDERGLOVE SZ 6.5 BLUE

## (undated) DEVICE — DRESSING TELFA 2 X 3 IN STRL

## (undated) DEVICE — SUT VICRYL 0 CT-1 27 IN J260H

## (undated) DEVICE — GAUZE SPONGES,16 PLY: Brand: CURITY

## (undated) DEVICE — SUT VICRYL 0 CT-1 36 IN J946H

## (undated) DEVICE — SUT VICRYL 0 CTX 36 IN J978H

## (undated) DEVICE — Device

## (undated) DEVICE — GLOVE PI ULTRA TOUCH SZ 6